# Patient Record
Sex: FEMALE | Race: WHITE | Employment: UNEMPLOYED | URBAN - METROPOLITAN AREA
[De-identification: names, ages, dates, MRNs, and addresses within clinical notes are randomized per-mention and may not be internally consistent; named-entity substitution may affect disease eponyms.]

---

## 2017-07-20 ENCOUNTER — HOSPITAL ENCOUNTER (OUTPATIENT)
Dept: ULTRASOUND IMAGING | Age: 32
Discharge: HOME | End: 2017-07-20
Attending: FAMILY MEDICINE
Payer: COMMERCIAL

## 2017-07-20 DIAGNOSIS — K43.9 HERNIA, VENTRAL: ICD-10-CM

## 2017-07-20 PROCEDURE — 76700 US EXAM ABDOM COMPLETE: CPT

## 2017-11-29 ENCOUNTER — HOSPITAL ENCOUNTER (OUTPATIENT)
Dept: GENERAL RADIOLOGY | Age: 32
Discharge: HOME | End: 2017-11-29
Attending: OBSTETRICS & GYNECOLOGY
Payer: COMMERCIAL

## 2017-11-29 DIAGNOSIS — Z31.41 FERTILITY TESTING: ICD-10-CM

## 2017-11-29 PROCEDURE — 74011636320 HC RX REV CODE- 636/320: Performed by: RADIOLOGY

## 2017-11-29 PROCEDURE — 58340 CATHETER FOR HYSTEROGRAPHY: CPT

## 2017-11-29 RX ADMIN — IOHEXOL 6 ML: 240 INJECTION, SOLUTION INTRATHECAL; INTRAVASCULAR; INTRAVENOUS; ORAL at 14:00

## 2017-11-29 NOTE — PROCEDURES
Patient presented to [facility] radiology for scheduled procedure to evaluate tubal patency. Procedure reviewed and verbal consent obtained   LMP 11/21/17  Cycle day8 today. Negative UPT obtained in [facility] today prior to procedure     Procedure:  Patient was placed in the lithotomy position and sterile speculum placed to allow visualization of the cervix. The cervix was swabbed with betadine. Saline infusion catheter was advanced through the cervical os and balloon inflated with 5cc air. All other instruments were removed from the vagina. Patient was extended to the supine position    film obtained to confirm proper positioning   With Radiologic assessment (provided by Dr Kath Walton) approximately 6cc of omnipaque was injected into the uterus. Normal uterine cavity was revealed. Identification of both fallopian tubes revealed bilateral spill consistent with tubal patency. Formal report to follow by radiology   The catheter was removed.  Patient tolerated the procedure well and findings were discussed

## 2018-05-11 ENCOUNTER — HOSPITAL ENCOUNTER (EMERGENCY)
Age: 33
Discharge: HOME | End: 2018-05-11
Attending: EMERGENCY MEDICINE
Payer: COMMERCIAL

## 2018-05-11 ENCOUNTER — APPOINTMENT (OUTPATIENT)
Dept: ULTRASOUND IMAGING | Age: 33
End: 2018-05-11
Attending: EMERGENCY MEDICINE
Payer: COMMERCIAL

## 2018-05-11 VITALS
SYSTOLIC BLOOD PRESSURE: 118 MMHG | OXYGEN SATURATION: 98 % | RESPIRATION RATE: 15 BRPM | HEART RATE: 84 BPM | WEIGHT: 110.45 LBS | HEIGHT: 66 IN | TEMPERATURE: 98.2 F | BODY MASS INDEX: 17.75 KG/M2 | DIASTOLIC BLOOD PRESSURE: 62 MMHG

## 2018-05-11 DIAGNOSIS — O03.4 INEVITABLE ABORTION: Primary | ICD-10-CM

## 2018-05-11 LAB
ANION GAP SERPL CALC-SCNC: 8 MMOL/L (ref 5–15)
APPEARANCE UR: CLEAR
BACTERIA URNS QL MICRO: NEGATIVE /HPF
BASOPHILS # BLD: 0 K/UL (ref 0–0.1)
BASOPHILS NFR BLD: 1 % (ref 0–1)
BILIRUB UR QL: NEGATIVE
BUN SERPL-MCNC: 11 MG/DL (ref 6–20)
BUN/CREAT SERPL: 16 (ref 12–20)
CALCIUM SERPL-MCNC: 9 MG/DL (ref 8.5–10.1)
CHLORIDE SERPL-SCNC: 109 MMOL/L (ref 97–108)
CO2 SERPL-SCNC: 25 MMOL/L (ref 21–32)
COLOR UR: ABNORMAL
CREAT SERPL-MCNC: 0.7 MG/DL (ref 0.55–1.02)
DIFFERENTIAL METHOD BLD: NORMAL
EOSINOPHIL # BLD: 0.1 K/UL (ref 0–0.4)
EOSINOPHIL NFR BLD: 1 % (ref 0–7)
EPITH CASTS URNS QL MICRO: ABNORMAL /LPF
ERYTHROCYTE [DISTWIDTH] IN BLOOD BY AUTOMATED COUNT: 11.9 % (ref 11.5–14.5)
GLUCOSE SERPL-MCNC: 97 MG/DL (ref 65–100)
GLUCOSE UR STRIP.AUTO-MCNC: NEGATIVE MG/DL
HCG SERPL-ACNC: 687 MIU/ML (ref 0–6)
HCG UR QL: POSITIVE
HCT VFR BLD AUTO: 38.2 % (ref 35–47)
HGB BLD-MCNC: 13 G/DL (ref 11.5–16)
HGB UR QL STRIP: ABNORMAL
IMM GRANULOCYTES # BLD: 0 K/UL (ref 0–0.04)
IMM GRANULOCYTES NFR BLD AUTO: 0 % (ref 0–0.5)
KETONES UR QL STRIP.AUTO: NEGATIVE MG/DL
LEUKOCYTE ESTERASE UR QL STRIP.AUTO: NEGATIVE
LYMPHOCYTES # BLD: 2.5 K/UL (ref 0.8–3.5)
LYMPHOCYTES NFR BLD: 28 % (ref 12–49)
MCH RBC QN AUTO: 30.7 PG (ref 26–34)
MCHC RBC AUTO-ENTMCNC: 34 G/DL (ref 30–36.5)
MCV RBC AUTO: 90.1 FL (ref 80–99)
MONOCYTES # BLD: 0.6 K/UL (ref 0–1)
MONOCYTES NFR BLD: 7 % (ref 5–13)
MUCOUS THREADS URNS QL MICRO: ABNORMAL /LPF
NEUTS SEG # BLD: 5.5 K/UL (ref 1.8–8)
NEUTS SEG NFR BLD: 63 % (ref 32–75)
NITRITE UR QL STRIP.AUTO: NEGATIVE
NRBC # BLD: 0 K/UL (ref 0–0.01)
NRBC BLD-RTO: 0 PER 100 WBC
PH UR STRIP: 6 [PH] (ref 5–8)
PLATELET # BLD AUTO: 175 K/UL (ref 150–400)
PMV BLD AUTO: 9.5 FL (ref 8.9–12.9)
POTASSIUM SERPL-SCNC: 3.4 MMOL/L (ref 3.5–5.1)
PROT UR STRIP-MCNC: NEGATIVE MG/DL
RBC # BLD AUTO: 4.24 M/UL (ref 3.8–5.2)
RBC #/AREA URNS HPF: ABNORMAL /HPF (ref 0–5)
SODIUM SERPL-SCNC: 142 MMOL/L (ref 136–145)
SP GR UR REFRACTOMETRY: 1.02 (ref 1–1.03)
UA: UC IF INDICATED,UAUC: ABNORMAL
UROBILINOGEN UR QL STRIP.AUTO: 0.2 EU/DL (ref 0.2–1)
WBC # BLD AUTO: 8.7 K/UL (ref 3.6–11)
WBC URNS QL MICRO: ABNORMAL /HPF (ref 0–4)

## 2018-05-11 PROCEDURE — 85025 COMPLETE CBC W/AUTO DIFF WBC: CPT | Performed by: EMERGENCY MEDICINE

## 2018-05-11 PROCEDURE — 76830 TRANSVAGINAL US NON-OB: CPT

## 2018-05-11 PROCEDURE — 80048 BASIC METABOLIC PNL TOTAL CA: CPT | Performed by: EMERGENCY MEDICINE

## 2018-05-11 PROCEDURE — 36415 COLL VENOUS BLD VENIPUNCTURE: CPT | Performed by: EMERGENCY MEDICINE

## 2018-05-11 PROCEDURE — 81001 URINALYSIS AUTO W/SCOPE: CPT | Performed by: EMERGENCY MEDICINE

## 2018-05-11 PROCEDURE — 99284 EMERGENCY DEPT VISIT MOD MDM: CPT

## 2018-05-11 PROCEDURE — 74011250637 HC RX REV CODE- 250/637: Performed by: EMERGENCY MEDICINE

## 2018-05-11 PROCEDURE — 84702 CHORIONIC GONADOTROPIN TEST: CPT | Performed by: EMERGENCY MEDICINE

## 2018-05-11 PROCEDURE — 81025 URINE PREGNANCY TEST: CPT

## 2018-05-11 RX ORDER — LORAZEPAM 1 MG/1
1 TABLET ORAL
Status: COMPLETED | OUTPATIENT
Start: 2018-05-11 | End: 2018-05-11

## 2018-05-11 RX ADMIN — LORAZEPAM 1 MG: 1 TABLET ORAL at 23:43

## 2018-05-11 NOTE — LETTER
[address] 
[facility] EMERGENCY DEPT 
[address] [place] [address] [address] [id number] [phone] Work/School Note Date: 5/11/2018 To Whom It May concern: 
 
Honorio Walsh was seen and treated today in the emergency room by the following provider(s): 
Attending Provider: Devorah Burton DO. Honorio Walsh may return to work on 5/15/18 Sincerely, Devorah Burton DO

## 2018-05-12 NOTE — DISCHARGE INSTRUCTIONS
Miscarriage: Care Instructions  Your Care Instructions    The loss of a pregnancy can be very hard. You may wonder why it happened or blame yourself. Miscarriages are common and are not caused by exercise, stress, or sex. Most happen because the fertilized egg in the uterus does not develop normally. There is no treatment that can stop a miscarriage. As long as you do not have heavy blood loss, fever, weakness, or other signs of infection, you can let a miscarriage follow its own course. This can take several days. Your body will recover over the next several weeks. Having a miscarriage does not mean you cannot have a normal pregnancy in the future. The doctor has checked you carefully, but problems can develop later. If you notice any problems or new symptoms, get medical treatment right away. Follow-up care is a key part of your treatment and safety. Be sure to make and go to all appointments, and call your doctor if you are having problems. It's also a good idea to know your test results and keep a list of the medicines you take. How can you care for yourself at home? · You will probably have some vaginal bleeding for 1 to 2 weeks. It may be similar to or slightly heavier than a normal period. The bleeding should get lighter after a week. Use pads instead of tampons. You may use tampons during your next period, which should start in 3 to 6 weeks. · Take an over-the-counter pain medicine, such as acetaminophen (Tylenol), ibuprofen (Advil, Motrin), or naproxen (Aleve) for cramps. Read and follow all instructions on the label. You may have cramps for several days after the miscarriage. · Do not take two or more pain medicines at the same time unless the doctor told you to. Many pain medicines have acetaminophen, which is Tylenol. Too much acetaminophen (Tylenol) can be harmful. · Use a clear container to save any tissue that you pass. Take it to your doctor's office as soon as you can.   · Do not have sex until the bleeding stops. · You may return to your normal activities if you feel well enough to do so. But you should avoid heavy exercise until the bleeding stops. · If you plan to get pregnant again, check with your doctor. Most doctors suggest waiting until you have had at least one normal period before you try to get pregnant. · If you do not want to get pregnant, ask your doctor about birth control. You can get pregnant again before your next period starts if you are not using birth control. · You may be low in iron because of blood loss. Eat a balanced diet that is high in iron and vitamin C. Foods rich in iron include red meat, shellfish, eggs, beans, and leafy green vegetables. Foods high in vitamin C include citrus fruits, tomatoes, and broccoli. Talk to your doctor about whether you need to take iron pills or a multivitamin. · The loss of a pregnancy can be very hard. You may wonder why it happened and blame yourself. Talking to family members, friends, a counselor, or your doctor may help you cope with your loss. When should you call for help? Call 911 anytime you think you may need emergency care. For example, call if:  ? · You passed out (lost consciousness). ?Call your doctor now or seek immediate medical care if:  ? · You have severe vaginal bleeding. ? · You are dizzy or lightheaded, or you feel like you may faint. ? · You have new or worse pain in your belly or pelvis. ? · You have a fever. ? · You have vaginal discharge that smells bad. ? Watch closely for changes in your health, and be sure to contact your doctor if:  ? · You do not get better as expected. Where can you learn more? Go to http://mook-jo.info/. Enter E802 in the search box to learn more about \"Miscarriage: Care Instructions. \"  Current as of: March 16, 2017  Content Version: 11.4  © 0699-7547 [employer], [employer].  Care instructions adapted under license by [employer] (which disclaims liability or warranty for this information). If you have questions about a medical condition or this instruction, always ask your healthcare professional. [address] any warranty or liability for your use of this information.

## 2018-05-12 NOTE — ED NOTES
Dr. Mendy Church going over dc instructions with pt and family at this time. No questions or concerns. Pt taken out to car with family via wheelchair.

## 2018-05-12 NOTE — ED PROVIDER NOTES
EMERGENCY DEPARTMENT HISTORY AND PHYSICAL EXAM      Date: 5/11/2018  Patient Name: Rebel Melgar    History of Presenting Illness     Chief Complaint   Patient presents with    Miscarriage     She is 6 weeks pregnant and believes she is having a miscarriage - heavy bleeding. History Provided By: Patient    HPI: Rebel Melgar, 34 y.o. female, presents ambulatory to the ED with cc of heavy vaginal bleeding with associated lower back pain, pelvic and hip pain that occurred at 4 PM today. Per pt, she had noticed \"light spotting\" 2 days ago and reports being 6 weeks pregnant. She notes that she had gone to see her OB who did an US and found that they could not confirm the embryo was in the uterus. Pt has a current GPA of 2:1:0 and reports no prior hx of bleeding problems. She has taken Tylenol at 4:30 PM. Pt denies any fever. There are no other complaints, changes, or physical findings at this time. PCP: None      Past History     Past Medical History:  No past medical history on file. Past Surgical History:  No past surgical history on file. Family History:  No family history on file. Social History:  Social History   Substance Use Topics    Smoking status: Not on file    Smokeless tobacco: Not on file    Alcohol use Not on file       Allergies:  No Known Allergies      Review of Systems   Review of Systems   Constitutional: Negative for chills and fever. HENT: Negative for congestion and sore throat. Eyes: Negative for visual disturbance. Respiratory: Negative for cough and shortness of breath. Cardiovascular: Negative for chest pain and leg swelling. Gastrointestinal: Negative for abdominal pain, blood in stool, diarrhea and nausea. Endocrine: Negative for polyuria. Genitourinary: Positive for pelvic pain and vaginal bleeding. Negative for dysuria, flank pain and vaginal discharge. Musculoskeletal: Positive for back pain (lower). Negative for myalgias.    Skin: Negative for rash.   Allergic/Immunologic: Negative for immunocompromised state. Neurological: Negative for weakness and headaches. Psychiatric/Behavioral: Negative for confusion. Physical Exam   Physical Exam   Constitutional: She is oriented to person, place, and time. She appears well-developed and well-nourished. Pt emotionally upset, crying   HENT:   Head: Normocephalic and atraumatic. Moist mucous membranes   Eyes: Conjunctivae are normal. Pupils are equal, round, and reactive to light. Right eye exhibits no discharge. Left eye exhibits no discharge. Neck: Normal range of motion. Neck supple. No tracheal deviation present. Cardiovascular: Normal rate, regular rhythm and normal heart sounds. No murmur heard. Pulmonary/Chest: Effort normal and breath sounds normal. No respiratory distress. She has no wheezes. She has no rales. Abdominal: Soft. Bowel sounds are normal. There is no tenderness. There is no rebound and no guarding. Musculoskeletal: Normal range of motion. She exhibits no edema, tenderness or deformity. Neurological: She is alert and oriented to person, place, and time. Skin: Skin is warm and dry. No rash noted. No erythema. Psychiatric: Her behavior is normal.   Nursing note and vitals reviewed.         Diagnostic Study Results     Labs -     Recent Results (from the past 12 hour(s))   URINALYSIS W/ REFLEX CULTURE    Collection Time: 05/11/18  9:21 PM   Result Value Ref Range    Color YELLOW/STRAW      Appearance CLEAR CLEAR      Specific gravity 1.021 1.003 - 1.030      pH (UA) 6.0 5.0 - 8.0      Protein NEGATIVE  NEG mg/dL    Glucose NEGATIVE  NEG mg/dL    Ketone NEGATIVE  NEG mg/dL    Bilirubin NEGATIVE  NEG      Blood LARGE (A) NEG      Urobilinogen 0.2 0.2 - 1.0 EU/dL    Nitrites NEGATIVE  NEG      Leukocyte Esterase NEGATIVE  NEG      WBC 0-4 0 - 4 /hpf    RBC 5-10 0 - 5 /hpf    Epithelial cells FEW FEW /lpf    Bacteria NEGATIVE  NEG /hpf    UA:UC IF INDICATED CULTURE NOT INDICATED BY UA RESULT CNI      Mucus TRACE (A) NEG /lpf   HCG URINE, QL. - POC    Collection Time: 05/11/18  9:21 PM   Result Value Ref Range    Pregnancy test,urine (POC) POSITIVE (A) NEG     CBC WITH AUTOMATED DIFF    Collection Time: 05/11/18 10:17 PM   Result Value Ref Range    WBC 8.7 3.6 - 11.0 K/uL    RBC 4.24 3.80 - 5.20 M/uL    HGB 13.0 11.5 - 16.0 g/dL    HCT 38.2 35.0 - 47.0 %    MCV 90.1 80.0 - 99.0 FL    MCH 30.7 26.0 - 34.0 PG    MCHC 34.0 30.0 - 36.5 g/dL    RDW 11.9 11.5 - 14.5 %    PLATELET 226 222 - 111 K/uL    MPV 9.5 8.9 - 12.9 FL    NRBC 0.0 0  WBC    ABSOLUTE NRBC 0.00 0.00 - 0.01 K/uL    NEUTROPHILS 63 32 - 75 %    LYMPHOCYTES 28 12 - 49 %    MONOCYTES 7 5 - 13 %    EOSINOPHILS 1 0 - 7 %    BASOPHILS 1 0 - 1 %    IMMATURE GRANULOCYTES 0 0.0 - 0.5 %    ABS. NEUTROPHILS 5.5 1.8 - 8.0 K/UL    ABS. LYMPHOCYTES 2.5 0.8 - 3.5 K/UL    ABS. MONOCYTES 0.6 0.0 - 1.0 K/UL    ABS. EOSINOPHILS 0.1 0.0 - 0.4 K/UL    ABS. BASOPHILS 0.0 0.0 - 0.1 K/UL    ABS. IMM. GRANS. 0.0 0.00 - 0.04 K/UL    DF AUTOMATED     METABOLIC PANEL, BASIC    Collection Time: 05/11/18 10:17 PM   Result Value Ref Range    Sodium 142 136 - 145 mmol/L    Potassium 3.4 (L) 3.5 - 5.1 mmol/L    Chloride 109 (H) 97 - 108 mmol/L    CO2 25 21 - 32 mmol/L    Anion gap 8 5 - 15 mmol/L    Glucose 97 65 - 100 mg/dL    BUN 11 6 - 20 MG/DL    Creatinine 0.70 0.55 - 1.02 MG/DL    BUN/Creatinine ratio 16 12 - 20      GFR est AA >60 >60 ml/min/1.73m2    GFR est non-AA >60 >60 ml/min/1.73m2    Calcium 9.0 8.5 - 10.1 MG/DL   TOTAL HCG, QT. Collection Time: 05/11/18 10:17 PM   Result Value Ref Range    Beta HCG,  (H) 0 - 6 MIU/ML       Radiologic Studies -   US TRANSVAGINAL   Final Result   FINDINGS transvaginal: The UTERUS MEASURES 8.0 x 4.8 x 5.6 cm. The central  endometrium measures 20mm in thickness. Within the fundal endometrial canal,  there is a small well-circumscribed fluid sac with a possible decidual reaction.   The sac measures 0.4 x 0.3 x 0.5 cm, too small for measurement of gestational  age. There is no fetal pole, yolk sac or other structure within the fluid sac. The internal cervical os is closed. Nabothian cysts are suggested at the  internal cervical os.     Amniotic fluid volume is small. . . The placenta cannot be evaluated due to  early gestational age. .     There is no free fluid in the cul-de-sac.     The RIGHT OVARY measures 2.5 x 1.8 x 3.1 cm . The right ovary contains a  dominant cyst measuring 1.1 cm. The right ovary shows normal blood flow by color  Doppler. LEFT OVARY cannot be seen because of overlying bowel gas obscuring the  adnexal regions.      IMPRESSION  IMPRESSION:   1. Transabdominal pelvic ultrasound not performed. 2. Transvaginal pelvic ultrasound revealing single intrauterine gestational sac  is favored, too small for estimate of gestational age and containing no fetal  pole or yolk sac. Follow-up ultrasonography will be necessary, and ectopic  gestation is not excluded. . Correlate with serum beta hCG. Dominant right  ovarian cyst. Left ovary not seen. .        Medical Decision Making   I am the first provider for this patient. I reviewed the vital signs, available nursing notes, past medical history, past surgical history, family history and social history. Vital Signs-Reviewed the patient's vital signs.   Patient Vitals for the past 12 hrs:   Temp Pulse Resp BP SpO2   05/11/18 2310 98.4 °F (36.9 °C) 80 15 113/51 100 %   05/11/18 2216 - - - 111/58 -   05/11/18 2154 - - - - 100 %   05/11/18 2116 98.5 °F (36.9 °C) (!) 106 16 (!) 141/91 99 %       Pulse Oximetry Analysis - 100% on room air    Cardiac Monitor:   Rate: 80 bpm  Rhythm: Normal Sinus Rhythm 113/51     Records Reviewed: Nursing Notes, Old Medical Records and Previous Laboratory Studies    Provider Notes (Medical Decision Making):   DDx: AB, threatened AB, ectopic pregnancy, most consistent with spontaneous AB    ED Course:   Initial assessment performed. The patients presenting problems have been discussed, and they are in agreement with the care plan formulated and outlined with them. I have encouraged them to ask questions as they arise throughout their visit. Disposition:  DISCHARGE NOTE  11:46 PM  The patient has been re-evaluated and is ready for discharge. Reviewed available results with patient. Counseled patient on diagnosis and care plan. Patient has expressed understanding, and all questions have been answered. Patient agrees with plan and agrees to follow up as recommended, or return to the ED if their symptoms worsen. Discharge instructions have been provided and explained to the patient, along with reasons to return to the ED. PLAN:  1. There are no discharge medications for this patient. 2.   Follow-up Information     Follow up With Details Comments Karen Lorenz MD Schedule an appointment as soon as possible for a visit  [address] [facility]  [phone]      [facility] EMERGENCY DEPT  If symptoms worsen [address] [place] [address]  [phone]        Return to ED if worse     Diagnosis     Clinical Impression:   1. Inevitable         Attestations: This note is prepared by Angélica Herron, acting as Scribe for [employer], DO. [employer], DO: The scribe's documentation has been prepared under my direction and personally reviewed by me in its entirety. I confirm that the note above accurately reflects all work, treatment, procedures, and medical decision making performed by me.

## 2018-08-08 LAB
CHLAMYDIA, EXTERNAL: NEGATIVE
HBSAG, EXTERNAL: NEGATIVE
HIV, EXTERNAL: NON REACTIVE
N. GONORRHEA, EXTERNAL: NEGATIVE
RUBELLA, EXTERNAL: NORMAL
T. PALLIDUM, EXTERNAL: NEGATIVE

## 2019-02-15 LAB — GRBS, EXTERNAL: NEGATIVE

## 2019-03-07 ENCOUNTER — ANESTHESIA EVENT (OUTPATIENT)
Dept: LABOR AND DELIVERY | Age: 34
End: 2019-03-07
Payer: COMMERCIAL

## 2019-03-07 ENCOUNTER — HOSPITAL ENCOUNTER (INPATIENT)
Age: 34
LOS: 2 days | Discharge: HOME | End: 2019-03-09
Attending: OBSTETRICS & GYNECOLOGY | Admitting: OBSTETRICS & GYNECOLOGY
Payer: COMMERCIAL

## 2019-03-07 ENCOUNTER — ANESTHESIA (OUTPATIENT)
Dept: LABOR AND DELIVERY | Age: 34
End: 2019-03-07
Payer: COMMERCIAL

## 2019-03-07 DIAGNOSIS — G89.18 POST-OP PAIN: Primary | ICD-10-CM

## 2019-03-07 PROBLEM — Z34.90 PREGNANCY: Status: ACTIVE | Noted: 2019-03-07

## 2019-03-07 LAB
ABO + RH BLD: NORMAL
BASOPHILS # BLD: 0 K/UL (ref 0–0.1)
BASOPHILS NFR BLD: 0 % (ref 0–1)
BLD PROD TYP BPU: NORMAL
BLD PROD TYP BPU: NORMAL
BLOOD GROUP ANTIBODIES SERPL: NORMAL
BLOOD GROUP ANTIBODIES SERPL: NORMAL
BPU ID: NORMAL
BPU ID: NORMAL
CROSSMATCH RESULT,%XM: NORMAL
CROSSMATCH RESULT,%XM: NORMAL
DIFFERENTIAL METHOD BLD: ABNORMAL
EOSINOPHIL # BLD: 0 K/UL (ref 0–0.4)
EOSINOPHIL NFR BLD: 0 % (ref 0–7)
ERYTHROCYTE [DISTWIDTH] IN BLOOD BY AUTOMATED COUNT: 14 % (ref 11.5–14.5)
HCT VFR BLD AUTO: 37.1 % (ref 35–47)
HGB BLD-MCNC: 12.7 G/DL (ref 11.5–16)
IMM GRANULOCYTES # BLD AUTO: 0.2 K/UL (ref 0–0.04)
IMM GRANULOCYTES NFR BLD AUTO: 2 % (ref 0–0.5)
LYMPHOCYTES # BLD: 2.3 K/UL (ref 0.8–3.5)
LYMPHOCYTES NFR BLD: 20 % (ref 12–49)
MCH RBC QN AUTO: 32 PG (ref 26–34)
MCHC RBC AUTO-ENTMCNC: 34.2 G/DL (ref 30–36.5)
MCV RBC AUTO: 93.5 FL (ref 80–99)
MONOCYTES # BLD: 0.7 K/UL (ref 0–1)
MONOCYTES NFR BLD: 6 % (ref 5–13)
NEUTS SEG # BLD: 8.1 K/UL (ref 1.8–8)
NEUTS SEG NFR BLD: 72 % (ref 32–75)
NRBC # BLD: 0 K/UL (ref 0–0.01)
NRBC BLD-RTO: 0 PER 100 WBC
PLATELET # BLD AUTO: 159 K/UL (ref 150–400)
PMV BLD AUTO: 10 FL (ref 8.9–12.9)
RBC # BLD AUTO: 3.97 M/UL (ref 3.8–5.2)
SPECIMEN EXP DATE BLD: NORMAL
STATUS OF UNIT,%ST: NORMAL
STATUS OF UNIT,%ST: NORMAL
UNIT DIVISION, %UDIV: 0
UNIT DIVISION, %UDIV: 0
WBC # BLD AUTO: 11.3 K/UL (ref 3.6–11)

## 2019-03-07 PROCEDURE — 76060000033 HC ANESTHESIA 1 TO 1.5 HR: Performed by: OBSTETRICS & GYNECOLOGY

## 2019-03-07 PROCEDURE — 86921 COMPATIBILITY TEST INCUBATE: CPT

## 2019-03-07 PROCEDURE — 4A0HXCZ MEASUREMENT OF PRODUCTS OF CONCEPTION, CARDIAC RATE, EXTERNAL APPROACH: ICD-10-PCS | Performed by: OBSTETRICS & GYNECOLOGY

## 2019-03-07 PROCEDURE — 85025 COMPLETE CBC W/AUTO DIFF WBC: CPT

## 2019-03-07 PROCEDURE — 74011250636 HC RX REV CODE- 250/636

## 2019-03-07 PROCEDURE — 77010026065 HC OXYGEN MINIMUM MEDICAL AIR

## 2019-03-07 PROCEDURE — 86920 COMPATIBILITY TEST SPIN: CPT

## 2019-03-07 PROCEDURE — 75410000002 HC LABOR FEE PER 1 HR

## 2019-03-07 PROCEDURE — 76060000078 HC EPIDURAL ANESTHESIA: Performed by: OBSTETRICS & GYNECOLOGY

## 2019-03-07 PROCEDURE — 75410000003 HC RECOV DEL/VAG/CSECN EA 0.5 HR

## 2019-03-07 PROCEDURE — 86644 CMV ANTIBODY: CPT

## 2019-03-07 PROCEDURE — 76010000391 HC C SECN FIRST 1 HR: Performed by: OBSTETRICS & GYNECOLOGY

## 2019-03-07 PROCEDURE — 86870 RBC ANTIBODY IDENTIFICATION: CPT

## 2019-03-07 PROCEDURE — 74011000250 HC RX REV CODE- 250

## 2019-03-07 PROCEDURE — 74011250636 HC RX REV CODE- 250/636: Performed by: OBSTETRICS & GYNECOLOGY

## 2019-03-07 PROCEDURE — 76010000392 HC C SECN EA ADDL 0.5 HR: Performed by: OBSTETRICS & GYNECOLOGY

## 2019-03-07 PROCEDURE — 77030007866 HC KT SPN ANES BBMI -B: Performed by: ANESTHESIOLOGY

## 2019-03-07 PROCEDURE — 65410000002 HC RM PRIVATE OB

## 2019-03-07 PROCEDURE — 86900 BLOOD TYPING SEROLOGIC ABO: CPT

## 2019-03-07 PROCEDURE — 86922 COMPATIBILITY TEST ANTIGLOB: CPT

## 2019-03-07 PROCEDURE — 36415 COLL VENOUS BLD VENIPUNCTURE: CPT

## 2019-03-07 RX ORDER — MORPHINE SULFATE 0.5 MG/ML
INJECTION, SOLUTION EPIDURAL; INTRATHECAL; INTRAVENOUS AS NEEDED
Status: DISCONTINUED | OUTPATIENT
Start: 2019-03-07 | End: 2019-03-07 | Stop reason: HOSPADM

## 2019-03-07 RX ORDER — SODIUM CHLORIDE 0.9 % (FLUSH) 0.9 %
5-40 SYRINGE (ML) INJECTION EVERY 8 HOURS
Status: DISCONTINUED | OUTPATIENT
Start: 2019-03-07 | End: 2019-03-09

## 2019-03-07 RX ORDER — DOCUSATE SODIUM 100 MG/1
100 CAPSULE, LIQUID FILLED ORAL 2 TIMES DAILY
Status: DISCONTINUED | OUTPATIENT
Start: 2019-03-07 | End: 2019-03-09 | Stop reason: HOSPADM

## 2019-03-07 RX ORDER — SODIUM CHLORIDE 0.9 % (FLUSH) 0.9 %
5-40 SYRINGE (ML) INJECTION AS NEEDED
Status: DISCONTINUED | OUTPATIENT
Start: 2019-03-07 | End: 2019-03-09

## 2019-03-07 RX ORDER — SODIUM CHLORIDE 0.9 % (FLUSH) 0.9 %
5-40 SYRINGE (ML) INJECTION AS NEEDED
Status: DISCONTINUED | OUTPATIENT
Start: 2019-03-07 | End: 2019-03-07 | Stop reason: HOSPADM

## 2019-03-07 RX ORDER — ONDANSETRON 2 MG/ML
4 INJECTION INTRAMUSCULAR; INTRAVENOUS
Status: DISCONTINUED | OUTPATIENT
Start: 2019-03-07 | End: 2019-03-09 | Stop reason: HOSPADM

## 2019-03-07 RX ORDER — DIPHENHYDRAMINE HYDROCHLORIDE 50 MG/ML
25-50 INJECTION, SOLUTION INTRAMUSCULAR; INTRAVENOUS
Status: DISCONTINUED | OUTPATIENT
Start: 2019-03-07 | End: 2019-03-09 | Stop reason: HOSPADM

## 2019-03-07 RX ORDER — NALOXONE HYDROCHLORIDE 0.4 MG/ML
0.4 INJECTION, SOLUTION INTRAMUSCULAR; INTRAVENOUS; SUBCUTANEOUS AS NEEDED
Status: DISCONTINUED | OUTPATIENT
Start: 2019-03-07 | End: 2019-03-07

## 2019-03-07 RX ORDER — PHENYLEPHRINE HCL IN 0.9% NACL 0.4MG/10ML
SYRINGE (ML) INTRAVENOUS AS NEEDED
Status: DISCONTINUED | OUTPATIENT
Start: 2019-03-07 | End: 2019-03-07 | Stop reason: HOSPADM

## 2019-03-07 RX ORDER — BUPIVACAINE HYDROCHLORIDE 7.5 MG/ML
INJECTION, SOLUTION EPIDURAL; RETROBULBAR AS NEEDED
Status: DISCONTINUED | OUTPATIENT
Start: 2019-03-07 | End: 2019-03-07 | Stop reason: HOSPADM

## 2019-03-07 RX ORDER — MORPHINE SULFATE 10 MG/ML
4 INJECTION, SOLUTION INTRAMUSCULAR; INTRAVENOUS
Status: DISCONTINUED | OUTPATIENT
Start: 2019-03-07 | End: 2019-03-09 | Stop reason: HOSPADM

## 2019-03-07 RX ORDER — SIMETHICONE 80 MG
80 TABLET,CHEWABLE ORAL AS NEEDED
Status: DISCONTINUED | OUTPATIENT
Start: 2019-03-07 | End: 2019-03-09 | Stop reason: HOSPADM

## 2019-03-07 RX ORDER — CEFAZOLIN SODIUM/WATER 2 G/20 ML
2 SYRINGE (ML) INTRAVENOUS ONCE
Status: COMPLETED | OUTPATIENT
Start: 2019-03-07 | End: 2019-03-07

## 2019-03-07 RX ORDER — SODIUM CHLORIDE, SODIUM LACTATE, POTASSIUM CHLORIDE, CALCIUM CHLORIDE 600; 310; 30; 20 MG/100ML; MG/100ML; MG/100ML; MG/100ML
125 INJECTION, SOLUTION INTRAVENOUS CONTINUOUS
Status: DISCONTINUED | OUTPATIENT
Start: 2019-03-07 | End: 2019-03-09 | Stop reason: HOSPADM

## 2019-03-07 RX ORDER — SODIUM CHLORIDE, SODIUM LACTATE, POTASSIUM CHLORIDE, CALCIUM CHLORIDE 600; 310; 30; 20 MG/100ML; MG/100ML; MG/100ML; MG/100ML
INJECTION, SOLUTION INTRAVENOUS
Status: DISCONTINUED | OUTPATIENT
Start: 2019-03-07 | End: 2019-03-07 | Stop reason: HOSPADM

## 2019-03-07 RX ORDER — DIPHENHYDRAMINE HCL 25 MG
25 CAPSULE ORAL
Status: DISCONTINUED | OUTPATIENT
Start: 2019-03-07 | End: 2019-03-09 | Stop reason: HOSPADM

## 2019-03-07 RX ORDER — ONDANSETRON 2 MG/ML
INJECTION INTRAMUSCULAR; INTRAVENOUS AS NEEDED
Status: DISCONTINUED | OUTPATIENT
Start: 2019-03-07 | End: 2019-03-07 | Stop reason: HOSPADM

## 2019-03-07 RX ORDER — SODIUM CHLORIDE 0.9 % (FLUSH) 0.9 %
5-40 SYRINGE (ML) INJECTION EVERY 8 HOURS
Status: DISCONTINUED | OUTPATIENT
Start: 2019-03-07 | End: 2019-03-07 | Stop reason: HOSPADM

## 2019-03-07 RX ORDER — NALOXONE HYDROCHLORIDE 0.4 MG/ML
0.4 INJECTION, SOLUTION INTRAMUSCULAR; INTRAVENOUS; SUBCUTANEOUS AS NEEDED
Status: DISCONTINUED | OUTPATIENT
Start: 2019-03-07 | End: 2019-03-09 | Stop reason: HOSPADM

## 2019-03-07 RX ORDER — ACETAMINOPHEN 10 MG/ML
INJECTION, SOLUTION INTRAVENOUS AS NEEDED
Status: DISCONTINUED | OUTPATIENT
Start: 2019-03-07 | End: 2019-03-07 | Stop reason: HOSPADM

## 2019-03-07 RX ORDER — ONDANSETRON 2 MG/ML
4 INJECTION INTRAMUSCULAR; INTRAVENOUS
Status: DISCONTINUED | OUTPATIENT
Start: 2019-03-07 | End: 2019-03-07

## 2019-03-07 RX ORDER — OXYTOCIN 10 [USP'U]/ML
INJECTION, SOLUTION INTRAMUSCULAR; INTRAVENOUS AS NEEDED
Status: DISCONTINUED | OUTPATIENT
Start: 2019-03-07 | End: 2019-03-07 | Stop reason: HOSPADM

## 2019-03-07 RX ORDER — KETOROLAC TROMETHAMINE 30 MG/ML
30 INJECTION, SOLUTION INTRAMUSCULAR; INTRAVENOUS
Status: DISPENSED | OUTPATIENT
Start: 2019-03-07 | End: 2019-03-08

## 2019-03-07 RX ORDER — OXYTOCIN/RINGER'S LACTATE 20/1000 ML
999 PLASTIC BAG, INJECTION (ML) INTRAVENOUS ONCE
Status: ACTIVE | OUTPATIENT
Start: 2019-03-07 | End: 2019-03-08

## 2019-03-07 RX ORDER — KETOROLAC TROMETHAMINE 30 MG/ML
15 INJECTION, SOLUTION INTRAMUSCULAR; INTRAVENOUS
Status: ACTIVE | OUTPATIENT
Start: 2019-03-07 | End: 2019-03-08

## 2019-03-07 RX ORDER — IBUPROFEN 600 MG/1
600 TABLET ORAL EVERY 8 HOURS
Status: DISCONTINUED | OUTPATIENT
Start: 2019-03-08 | End: 2019-03-09 | Stop reason: HOSPADM

## 2019-03-07 RX ORDER — IBUPROFEN 600 MG/1
600 TABLET ORAL
Status: DISCONTINUED | OUTPATIENT
Start: 2019-03-07 | End: 2019-03-09 | Stop reason: HOSPADM

## 2019-03-07 RX ORDER — SODIUM CHLORIDE, SODIUM LACTATE, POTASSIUM CHLORIDE, CALCIUM CHLORIDE 600; 310; 30; 20 MG/100ML; MG/100ML; MG/100ML; MG/100ML
1000 INJECTION, SOLUTION INTRAVENOUS CONTINUOUS
Status: DISCONTINUED | OUTPATIENT
Start: 2019-03-07 | End: 2019-03-07 | Stop reason: HOSPADM

## 2019-03-07 RX ORDER — OXYCODONE AND ACETAMINOPHEN 5; 325 MG/1; MG/1
1 TABLET ORAL
Status: DISCONTINUED | OUTPATIENT
Start: 2019-03-07 | End: 2019-03-07

## 2019-03-07 RX ORDER — OXYCODONE AND ACETAMINOPHEN 5; 325 MG/1; MG/1
1-2 TABLET ORAL
Status: DISCONTINUED | OUTPATIENT
Start: 2019-03-07 | End: 2019-03-09 | Stop reason: HOSPADM

## 2019-03-07 RX ADMIN — Medication 80 MCG: at 14:30

## 2019-03-07 RX ADMIN — SODIUM CHLORIDE, SODIUM LACTATE, POTASSIUM CHLORIDE, CALCIUM CHLORIDE: 600; 310; 30; 20 INJECTION, SOLUTION INTRAVENOUS at 15:08

## 2019-03-07 RX ADMIN — SODIUM CHLORIDE, SODIUM LACTATE, POTASSIUM CHLORIDE, CALCIUM CHLORIDE: 600; 310; 30; 20 INJECTION, SOLUTION INTRAVENOUS at 14:36

## 2019-03-07 RX ADMIN — BUPIVACAINE HYDROCHLORIDE 12.5 MG: 7.5 INJECTION, SOLUTION EPIDURAL; RETROBULBAR at 14:26

## 2019-03-07 RX ADMIN — MORPHINE SULFATE 500 MCG: 0.5 INJECTION, SOLUTION EPIDURAL; INTRATHECAL; INTRAVENOUS at 14:26

## 2019-03-07 RX ADMIN — SODIUM CHLORIDE, SODIUM LACTATE, POTASSIUM CHLORIDE, AND CALCIUM CHLORIDE 1000 ML: 600; 310; 30; 20 INJECTION, SOLUTION INTRAVENOUS at 12:15

## 2019-03-07 RX ADMIN — KETOROLAC TROMETHAMINE 30 MG: 30 INJECTION, SOLUTION INTRAMUSCULAR at 19:08

## 2019-03-07 RX ADMIN — ACETAMINOPHEN 1000 MG: 10 INJECTION, SOLUTION INTRAVENOUS at 14:45

## 2019-03-07 RX ADMIN — Medication 80 MCG: at 14:33

## 2019-03-07 RX ADMIN — Medication 40 MCG: at 14:47

## 2019-03-07 RX ADMIN — Medication 80 MCG: at 14:26

## 2019-03-07 RX ADMIN — ONDANSETRON 4 MG: 2 INJECTION INTRAMUSCULAR; INTRAVENOUS at 14:43

## 2019-03-07 RX ADMIN — OXYTOCIN 20 UNITS: 10 INJECTION, SOLUTION INTRAMUSCULAR; INTRAVENOUS at 14:43

## 2019-03-07 RX ADMIN — Medication 2 G: at 14:10

## 2019-03-07 RX ADMIN — Medication 40 MCG: at 14:52

## 2019-03-07 NOTE — H&P
History & Physical    Name: Irma Race MRN: [id number]  SSN: [id number]    YOB: 1985  Age: 28 y.o. Sex: female      Subjective:     Estimated Date of Delivery: 3/10/19  OB History    Para Term  AB Living   3 1 1   1 1   SAB TAB Ectopic Molar Multiple Live Births   1         1      # Outcome Date GA Lbr Marcus/2nd Weight Sex Delivery Anes PTL Lv   3 Current            2 SAB 2018           1 Term 13     CS-LTranv   BENNY          Ms. Cecilia Guthrie admitted with pregnancy at 39w4d for  section due to previous  section. Prenatal course was normal. Please see prenatal records for details. Patient is Rh negative and received rhogam at 28 weeks. Hx of anxiety, no meds. Past Medical History:   Diagnosis Date    Rhesus isoimmunization affecting pregnancy     A negative     Past Surgical History:   Procedure Laterality Date    HX  SECTION      HX OTHER SURGICAL      rectal distasis hernia    HX OTHER SURGICAL      disfunctional bladder        Social History     Occupational History    Not on file   Tobacco Use    Smoking status: Never Smoker    Smokeless tobacco: Never Used   Substance and Sexual Activity    Alcohol use: No     Frequency: Never    Drug use: No    Sexual activity: Yes     Partners: Male     Family History   Problem Relation Age of Onset    Diabetes Mother     Hypertension Father     Psychiatric Disorder Sister        No Known Allergies  Prior to Admission medications    Not on File        Review of Systems: A comprehensive review of systems was negative except for that written in the History of Present Illness. Objective:     Vitals:  Vitals:    19 1242 19 1247 19 1250 19 1252   BP:   117/56    Pulse:   81    Resp:   16    Temp:   98.1 °F (36.7 °C)    SpO2: 99% 99% 99% 99%   Weight:       Height:            Physical Exam:  Patient without distress.   Heart: Regular rate and rhythm  Lung: normal respiratory effort  Abdomen: soft, nontender  Fundus: soft and non tender  Perineum: blood absent, amniotic fluid absent  Lower Extremities: no edema  Membranes:  Intact  Fetal Heart Rate: Reactive    Prenatal Labs:   Lab Results   Component Value Date/Time    ABO/Rh(D) A NEGATIVE 2019 12:20 PM         Impression/Plan:     Plan:  Admit for  section. Group B Strep was negative. Discussed the risks of surgery including the risks of bleeding, infection, deep vein thrombosis, and surgical injuries to internal organs including but not limited to the bowels, bladder, rectum, and female reproductive organs. The patient understands the risks; any and all questions were answered to the patient's satisfaction.     Signed By:  Dmaaris Fischer MD     2019

## 2019-03-07 NOTE — OP NOTES
Operative Note    Name: Mony Simmons   Medical Record Number: [id number]   YOB: 1985  Today's Date: 2019      Pre-operative Diagnosis: Repeat C/S; /EDC 3/10/19    Post-operative Diagnosis: same    Operation: low transverse  section Procedure(s):   SECTION    Surgeon(s):  MD Erick De La Cruz MD    Anesthesia: Spinal    Prophylactic Antibiotics: Ancef  DVT Prophylaxis: Sequential Compression Devices         Fetal Description: sen     Birth Information:   Information for the patient's :  Kirsten Chavez [[id number]]   Delivery of a 3.785 kg female infant on 3/7/2019 at 2:42 PM. Apgars were 8  and 9 . Umbilical Cord: 3 Vessels     Umbilical Cord Events: None     Placenta: Manual Removal removal with Intact; Normal appearance. Amniotic Fluid Volume:  Large     Amniotic Fluid Description:  Clear          Placenta:  spontaneous    Estimated Blood Loss (ml):  800 mL    Specimens: none           Complications:  none    Procedure Detail:      After proper patient identification and consent, the patient was taken to the operating room, where spinal anesthesia was administered and found to be adequate. Martinez catheter was placed using sterile technique. The patient was prepped and draped in the normal sterile fashion. The abdomen was entered using the Pfannenstiel technique. The peritoneum was entered sharply well superior to the bladder without any apparent injury. The bladder flap was not created. A low transverse uterine incision was made with the scalpel  and extended with blunt finger dissection. Amniotomy was performed and the fluid was copius amount clear. The babys head was then delivered atraumatically. The cord was clamped and cut and the baby was handed off to Nursing staff in attendance. Placenta was then removed from the uterus. The uterus was curettaged with a moist lap pad and cleared of all clots and debris.  The uterine incision was closed with 0 monocryl, double layer  in running locking fashion. A figure of eight suture was placed over an area of bleeding with good hemostasis assured. The uterus was returned to the abdomen and hemostasis was confirmed. The fascia was closed with 0 Vicryl in a running fashion. The subcutaneous space was irrigated and made hemostatic with cautery. The subcutaneous layer was closed with 3-0 Vicryl in an interrupted fashion. The skin was closed with a 3-0 monocryl closure. The patient tolerated the procedure well. Sponge, lap, and needle counts were correct times three and the patient and baby were taken to recovery/postpartum room in stable condition.     Tu Pham MD  March 7, 2019  3:19 PM

## 2019-03-07 NOTE — ANESTHESIA PROCEDURE NOTES
Spinal Block    Performed by: Maya Perdomo MD  Authorized by: Maya Perdomo MD                 Spinal Procedure Note    Risk and benefits were discussed with the patient and plans are to proceed. Patient was placed in the seated position. The back was prepped at the lumbar region with Duraprep. 3 mL 1% lidocaine was used as local.    Epidural space identified with 17 gauge Touhy @ L3 - L4    A 25 g pencil point spinal needle was placed  and advanced until CSF was obtained. 1.6 mL 0.75% Spinal Marcaine with dextrose + 0.5 mg Duramorph was deposited into the CSF. - paresthesia.

## 2019-03-07 NOTE — ANESTHESIA POSTPROCEDURE EVALUATION
Procedure(s):   SECTION. Anesthesia Post Evaluation        Patient location during evaluation: PACU  Note status: Adequate. Level of consciousness: responsive to verbal stimuli and sleepy but conscious  Pain management: satisfactory to patient  Airway patency: patent  Anesthetic complications: no  Cardiovascular status: acceptable  Respiratory status: acceptable  Hydration status: acceptable  Comments: +Post-Anesthesia Evaluation and Assessment    Patient: Chance Rhodes MRN: [id number]  SSN: [id number]   YOB: 1985  Age: 28 y.o. Sex: female      Cardiovascular Function/Vital Signs    BP 94/58   Pulse 85   Temp 35.6 °C (96.1 °F)   Resp 16   Ht 5' 6\" (1.676 m)   Wt 74.8 kg (165 lb)   SpO2 100%   Breastfeeding? Unknown   BMI 26.63 kg/m²     Patient is status post Procedure(s):   SECTION. Nausea/Vomiting: Controlled. Postoperative hydration reviewed and adequate. Pain:  Pain Scale 1: Numeric (0 - 10) (19 1600)  Pain Intensity 1: 0 (19 1600)   Managed. Neurological Status:   Neuro (WDL): Within Defined Limits (19 1530)   At baseline. Mental Status and Level of Consciousness: Arousable. Pulmonary Status:   O2 Device: Room air (19 1530)   Adequate oxygenation and airway patent. Complications related to anesthesia: None    Post-anesthesia assessment completed. No concerns. Signed By: Gilda Parks MD    3/7/2019  Post anesthesia nausea and vomiting:  controlled      Visit Vitals  BP 94/58   Pulse 85   Temp 35.6 °C (96.1 °F)   Resp 16   Ht 5' 6\" (1.676 m)   Wt 74.8 kg (165 lb)   SpO2 100%   Breastfeeding?  Unknown   BMI 26.63 kg/m²

## 2019-03-07 NOTE — LACTATION NOTE
This note was copied from a baby's chart. @ 1 hour of life  Pt will successfully establish breastfeeding by feeding in response to early feeding cues or wake every 3h, will obtain deep latch, and will keep log of feedings/output. Taught to BF at hunger cues and or q 2-3 hrs and to offer 10-20 drops of hand expressed colostrum at any non-feeds. Breast Assessment  Left Breast: Medium  Left Nipple: Intact, Everted, Large, Moderate long  Right Breast: Medium  Right Nipple: Everted, Large, Intact, Moderate long  Breast- Feeding Assessment  Attends Breast-Feeding Classes: No  Breast-Feeding Experience: Yes(x 7 months)  Breast Trauma/Surgery: No  Type/Quality: Good  Lactation Consultant Visits  Breast-Feedings: Good   Mother/Infant Observation  Mother Observation: Alignment, Lets baby end feeding, Breast comfortable, Nipple round on release, Close hold, Holds breast, Recognizes feeding cues  Infant Observation: Audible swallows, Latches nipple and aereolae, Relaxed after feeding, Breast tissue moves, Lips flanged, lower, Rhythmic suck, Feeding cues, Lips flanged, upper, Opens mouth  LATCH Documentation  Latch: Grasps breast, tongue down, lips flanged, rhythmic sucking  Audible Swallowing: A few with stimulation  Type of Nipple: Everted (after stimulation)  Comfort (Breast/Nipple): Soft/non-tender  Hold (Positioning): Full assist, teach one side, mother does other, staff holds  LATCH Score: 8  While breastfeeding, reviewed breastfeeding basics:  How milk is made and normal  breastfeeding behaviors discussed. Supply and demand,  stomach size, early feeding cues, skin to skin bonding, positioning and baby led latch-on, assymetrical latch with signs of good, deep latch vs shallow, feeding frequency and duration, and log sheet for tracking infant feedings and output. Breastfeeding Booklet and Warm line information given.   Discussed typical  weight loss and the importance of infant weight checks with pediatrician 1 day post discharge. I/0 record initiated and explained how to know baby is getting enough. [facility] for outpatient follow up. Known to practice with NNP IBCLC's. Obtained her new pump from insurance company but plans to stay home this time, had lower supply concerns only because of back to work stress/time management. Expect success.

## 2019-03-07 NOTE — L&D DELIVERY NOTE
Delivery Summary  Patient: Janie Koroma             Circumcision:   NA-female  Additional Delivery Comments - scheduled RLTCS.  mL. Information for the patient's :  Esdras Elias [[id number]]       Labor Events:    Labor: No   Rupture Date: 3/7/2019   Rupture Time: 2:42 PM   Rupture Type AROM   Amniotic Fluid Volume: Large    Amniotic Fluid Description: Clear None   Induction: None       Augmentation: None   Labor Events: None     Cervical Ripening:     None     Delivery Events:  Episiotomy: None   Laceration(s): None     Repaired: None    Number of Repair Packets:     Suture Type and Size: None     Estimated Blood Loss (ml):  ml       Delivery Date: 3/7/2019    Delivery Time: 2:42 PM  Delivery Type: , Low Transverse  Sex:  Female     Gestational Age: 39w4d   Delivery Clinician:  Sera Lu  Living Status: Living   Delivery Location:              [facility]  One minute Five minutes Ten minutes   Skin color: 0   1        Heart rate: 2   2        Grimace: 2   2        Muscle tone: 2   2        Breathin   2        Totals: 8   9            Presentation: Vertex    Position:        Resuscitation Method:  Suctioning-bulb; Tactile Stimulation     Meconium Stained: None      Cord Information: 3 Vessels  Complications: None  Cord around:    Delayed cord clamping? Yes  Cord clamped date/time:   Disposition of Cord Blood: Lab    Blood Gases Sent?: No    Placenta:  Date/Time: 3/7/2019  2:43 PM  Removal: Manual Removal      Appearance: Intact; Normal     [place] Measurements:  Birth Weight: 3.785 kg      Birth Length: 51.4 cm      Head Circumference: 36.8 cm      Chest Circumference: 33 cm     Abdominal Girth: 32 cm    Other Providers:   ARMOND ANGLIN;ALEJANDRINA COATES;FAY CARRERA;;SERGEY MARAVILLA, Obstetrician;Primary Nurse;Primary [place] Nurse;Midwife;Nursery Nurse           Cord pH:  none    Episiotomy: None   Laceration(s): None     Estimated Blood Loss (ml):     Labor Events  Method: None      Augmentation: None   Cervical Ripening:       None        Operative Vaginal Delivery - none    Group B Strep: No results found for: GRBSEXT, GRBSEXT  Information for the patient's :  Angy Arriaza [[id number]]   No results found for: ABORH, PCTABR, PCTDIG, BILI, ABORHEXT, ABORH    No results found for: APH, APCO2, APO2, AHCO3, ABEC, ABDC, O2ST, EPHV, PCO2V, PO2V, HCO3V, EBEV, EBDV, SITE, RSCOM

## 2019-03-07 NOTE — ANESTHESIA PREPROCEDURE EVALUATION
Anesthetic History   No history of anesthetic complications            Review of Systems / Medical History  Patient summary reviewed, nursing notes reviewed and pertinent labs reviewed    Pulmonary  Within defined limits                 Neuro/Psych   Within defined limits           Cardiovascular  Within defined limits                Exercise tolerance: >4 METS     GI/Hepatic/Renal  Within defined limits              Endo/Other  Within defined limits           Other Findings   Comments: IUP, previous c/s           Physical Exam    Airway  Mallampati: II  TM Distance: 4 - 6 cm  Neck ROM: normal range of motion   Mouth opening: Normal     Cardiovascular  Regular rate and rhythm,  S1 and S2 normal,  no murmur, click, rub, or gallop             Dental  No notable dental hx       Pulmonary  Breath sounds clear to auscultation               Abdominal  GI exam deferred       Other Findings            Anesthetic Plan    ASA: 2  Anesthesia type: spinal      Post-op pain plan if not by surgeon: intrathecal opiates      Anesthetic plan and risks discussed with: Patient

## 2019-03-08 LAB
ERYTHROCYTE [DISTWIDTH] IN BLOOD BY AUTOMATED COUNT: 14.1 % (ref 11.5–14.5)
HCT VFR BLD AUTO: 30 % (ref 35–47)
HGB BLD-MCNC: 10.1 G/DL (ref 11.5–16)
MCH RBC QN AUTO: 31.6 PG (ref 26–34)
MCHC RBC AUTO-ENTMCNC: 33.7 G/DL (ref 30–36.5)
MCV RBC AUTO: 93.8 FL (ref 80–99)
NRBC # BLD: 0 K/UL (ref 0–0.01)
NRBC BLD-RTO: 0 PER 100 WBC
PLATELET # BLD AUTO: 122 K/UL (ref 150–400)
PMV BLD AUTO: 10.1 FL (ref 8.9–12.9)
RBC # BLD AUTO: 3.2 M/UL (ref 3.8–5.2)
WBC # BLD AUTO: 9.9 K/UL (ref 3.6–11)

## 2019-03-08 PROCEDURE — 86900 BLOOD TYPING SEROLOGIC ABO: CPT

## 2019-03-08 PROCEDURE — 85027 COMPLETE CBC AUTOMATED: CPT

## 2019-03-08 PROCEDURE — 85461 HEMOGLOBIN FETAL: CPT

## 2019-03-08 PROCEDURE — 74011250637 HC RX REV CODE- 250/637: Performed by: ANESTHESIOLOGY

## 2019-03-08 PROCEDURE — 36415 COLL VENOUS BLD VENIPUNCTURE: CPT

## 2019-03-08 PROCEDURE — 65410000002 HC RM PRIVATE OB

## 2019-03-08 PROCEDURE — 74011250637 HC RX REV CODE- 250/637: Performed by: OBSTETRICS & GYNECOLOGY

## 2019-03-08 RX ADMIN — DOCUSATE SODIUM 100 MG: 100 CAPSULE, LIQUID FILLED ORAL at 17:39

## 2019-03-08 RX ADMIN — IBUPROFEN 600 MG: 600 TABLET ORAL at 10:20

## 2019-03-08 RX ADMIN — IBUPROFEN 600 MG: 600 TABLET ORAL at 16:50

## 2019-03-08 RX ADMIN — DOCUSATE SODIUM 100 MG: 100 CAPSULE, LIQUID FILLED ORAL at 10:20

## 2019-03-08 RX ADMIN — OXYCODONE AND ACETAMINOPHEN 2 TABLET: 5; 325 TABLET ORAL at 16:50

## 2019-03-08 RX ADMIN — IBUPROFEN 600 MG: 600 TABLET ORAL at 02:50

## 2019-03-08 RX ADMIN — IBUPROFEN 600 MG: 600 TABLET ORAL at 23:26

## 2019-03-08 NOTE — PROGRESS NOTES
Duramorph Follow-Up Note    1 Day Post-Op sp Procedure(s):   SECTION. BP 99/58 (BP 1 Location: Left arm, BP Patient Position: Sitting)   Pulse 77   Temp 36.8 °C (98.2 °F)   Resp 18   Ht 5' 6\" (1.676 m)   Wt 74.8 kg (165 lb)   SpO2 99%   Breastfeeding? Unknown   BMI 26.63 kg/m² . Patient is POD-1 S/P intrathecal duramorph. Pain is well controlled  Patient reports no  fever, weakness or numbness. Epidural/spinal tap site is clean, dry and intact. No obvious Anesthesia complications noted although does complain of a mild headache. Will follow. Plan:    Pain management as per primary service.

## 2019-03-08 NOTE — PROGRESS NOTES
Post-Operative  Day 1    Edmund Peacock     Assessment: Post-Op day 1, stable    Plan:   1. Routine post-operative care   2. Acute blood loss anemia - hgb 12.7->10.1. Mild thrombocytopenia with plts 122. Will repeat cbc tomorrow. Iron on dc. Asx, VSS, UOP excellent   3. Rh neg - will need rhogam      Information for the patient's :  Ashley Ahn [[id number]]   , Low Transverse   Patient doing well without significant complaint. Nausea and vomiting resolved, tolerating diet, + flatus, azul in place. Vitals:  Visit Vitals  BP 99/58 (BP 1 Location: Left arm, BP Patient Position: Sitting)   Pulse 77   Temp 98.2 °F (36.8 °C)   Resp 18   Ht 5' 6\" (1.676 m)   Wt 74.8 kg (165 lb)   SpO2 99%   Breastfeeding? Unknown   BMI 26.63 kg/m²     Temp (24hrs), Av.6 °F (36.4 °C), Min:96.1 °F (35.6 °C), Max:98.4 °F (36.9 °C)      Last 24hr Input/Output:    Intake/Output Summary (Last 24 hours) at 3/8/2019 0909  Last data filed at 3/8/2019 0532  Gross per 24 hour   Intake 1358.75 ml   Output 5200 ml   Net -3841.25 ml          Exam:        Patient without distress. Lungs clear. Abdomen, bowel sounds present, soft, expected tenderness, fundus firm, incision c/d/i     Perineum normal lochia noted               Lower extremities are negative for swelling, cords or tenderness.     Labs:   Lab Results   Component Value Date/Time    WBC 9.9 2019 05:12 AM    WBC 11.3 (H) 2019 12:20 PM    WBC 8.7 2018 10:17 PM    HGB 10.1 (L) 2019 05:12 AM    HGB 12.7 2019 12:20 PM    HGB 13.0 2018 10:17 PM    HCT 30.0 (L) 2019 05:12 AM    HCT 37.1 2019 12:20 PM    HCT 38.2 2018 10:17 PM    PLATELET 886 (L)  05:12 AM    PLATELET 569  12:20 PM    PLATELET 698  10:17 PM       Recent Results (from the past 24 hour(s))   CBC WITH AUTOMATED DIFF    Collection Time: 19 12:20 PM   Result Value Ref Range    WBC 11.3 (H) 3.6 - 11.0 K/uL RBC 3.97 3.80 - 5.20 M/uL    HGB 12.7 11.5 - 16.0 g/dL    HCT 37.1 35.0 - 47.0 %    MCV 93.5 80.0 - 99.0 FL    MCH 32.0 26.0 - 34.0 PG    MCHC 34.2 30.0 - 36.5 g/dL    RDW 14.0 11.5 - 14.5 %    PLATELET 929 288 - 018 K/uL    MPV 10.0 8.9 - 12.9 FL    NRBC 0.0 0  WBC    ABSOLUTE NRBC 0.00 0.00 - 0.01 K/uL    NEUTROPHILS 72 32 - 75 %    LYMPHOCYTES 20 12 - 49 %    MONOCYTES 6 5 - 13 %    EOSINOPHILS 0 0 - 7 %    BASOPHILS 0 0 - 1 %    IMMATURE GRANULOCYTES 2 (H) 0.0 - 0.5 %    ABS. NEUTROPHILS 8.1 (H) 1.8 - 8.0 K/UL    ABS. LYMPHOCYTES 2.3 0.8 - 3.5 K/UL    ABS. MONOCYTES 0.7 0.0 - 1.0 K/UL    ABS. EOSINOPHILS 0.0 0.0 - 0.4 K/UL    ABS. BASOPHILS 0.0 0.0 - 0.1 K/UL    ABS. IMM.  GRANS. 0.2 (H) 0.00 - 0.04 K/UL    DF AUTOMATED     TYPE & SCREEN    Collection Time: 03/07/19 12:20 PM   Result Value Ref Range    Crossmatch Expiration 03/10/2019     ABO/Rh(D) A NEGATIVE     Antibody screen POS     Antibody ID Anti-D passively acquired     Unit number [id number]     Blood component type  LRIR     Unit division 00     Status of unit REL FROM [facility]     Crossmatch result Compatible     Unit number [id number]     [facility]     Unit division 00     Status of unit REL FROM [facility]     Crossmatch result Compatible    RH IMMUNE GLOBULIN EVAL-LAB ORDER    Collection Time: 03/08/19  5:12 AM   Result Value Ref Range    ABO/Rh(D) A NEGATIVE     Fetal screen NEG     WEAK D NEG     Unit number [id number]     Blood component type RH IMMUNE GLOBULIN     Unit division 00     Status of unit ALLOCATED    CBC W/O DIFF    Collection Time: 03/08/19  5:12 AM   Result Value Ref Range    WBC 9.9 3.6 - 11.0 K/uL    RBC 3.20 (L) 3.80 - 5.20 M/uL    HGB 10.1 (L) 11.5 - 16.0 g/dL    HCT 30.0 (L) 35.0 - 47.0 %    MCV 93.8 80.0 - 99.0 FL    MCH 31.6 26.0 - 34.0 PG    MCHC 33.7 30.0 - 36.5 g/dL    RDW 14.1 11.5 - 14.5 %    PLATELET 987 (L) 234 - 400 K/uL    MPV 10.1 8.9 - 12.9 FL    NRBC 0.0 0  WBC    ABSOLUTE NRBC 0. 00 0.00 - 0.01 K/uL

## 2019-03-08 NOTE — PROGRESS NOTES
Pt requesting to sleep and have the infant in the nursery until 2100. Informed pt that we would be switching nurses and she asked if we could not wake her for report.

## 2019-03-08 NOTE — ROUTINE PROCESS
Bedside shift change report given to KRISTEN Blanco (oncoming nurse) by MARCUS Pizarro (offgoing nurse). Report given with SBAR.

## 2019-03-08 NOTE — LACTATION NOTE
This note was copied from a baby's chart. 20 hours of life  9 x at breast  2 wet  4 stools    Breast Assessment  Left Breast: Medium  Left Nipple: Intact, Everted, Large, Moderate long  Right Breast: Medium  Right Nipple: Everted, Large, Intact, Moderate long  Breast- Feeding Assessment  Attends Breast-Feeding Classes: No  Breast-Feeding Experience: Yes(x 7 months)  Breast Trauma/Surgery: No  Type/Quality: Good  Lactation Consultant Visits  Breast-Feedings: Good   Mother/Infant Observation  Mother Observation: Alignment, Lets baby end feeding, Breast comfortable, Nipple round on release, Close hold, Holds breast, Recognizes feeding cues  Infant Observation: Audible swallows, Latches nipple and aereolae, Relaxed after feeding, Breast tissue moves, Lips flanged, lower, Rhythmic suck, Feeding cues, Lips flanged, upper, Opens mouth  LATCH Documentation  Latch: Grasps breast, tongue down, lips flanged, rhythmic sucking  Audible Swallowing: A few with stimulation  Type of Nipple: Everted (after stimulation)  Comfort (Breast/Nipple): Soft/non-tender  Hold (Positioning): No assist from staff, mother able to position/hold infant  LATCH Score: 9  Continue observation of feeding cues and obtain deep latch past longer bhavin nipple. Enjoying baby. Up ad negro in room with am care during [facility] rounds. Expect success. 1140  Addendum: Mother calls with request for deeper latch assistance. Anterior lip frenulum somewhat restrictive. Upper lip flanging is challenging. Posterior sub lingual frenulum does not appear restrictive, tongue extends past lower gum tissue. Once mother positioned up in chair with nursing pillow/alignment with baby in cross cradle hold/asymetrical latch achieved with only a few seconds of discomfort before easing into a comfortable rhythmic latch/pause and suckle. 45 minute session. Nipple round at base upon release. Lanolin encouraged after colostrum air drys. Mother pleased with feeding.   Advise asking pediatrician about upper lip tie/familial trait on her father's side/she & all her siblings had. Continue to alternate and find positions of comfort.

## 2019-03-09 VITALS
RESPIRATION RATE: 16 BRPM | TEMPERATURE: 97.9 F | BODY MASS INDEX: 26.52 KG/M2 | HEART RATE: 85 BPM | SYSTOLIC BLOOD PRESSURE: 100 MMHG | WEIGHT: 165 LBS | OXYGEN SATURATION: 100 % | HEIGHT: 66 IN | DIASTOLIC BLOOD PRESSURE: 63 MMHG

## 2019-03-09 LAB
ERYTHROCYTE [DISTWIDTH] IN BLOOD BY AUTOMATED COUNT: 14.3 % (ref 11.5–14.5)
HCT VFR BLD AUTO: 31 % (ref 35–47)
HGB BLD-MCNC: 10.4 G/DL (ref 11.5–16)
MCH RBC QN AUTO: 32.1 PG (ref 26–34)
MCHC RBC AUTO-ENTMCNC: 33.5 G/DL (ref 30–36.5)
MCV RBC AUTO: 95.7 FL (ref 80–99)
NRBC # BLD: 0 K/UL (ref 0–0.01)
NRBC BLD-RTO: 0 PER 100 WBC
PLATELET # BLD AUTO: 139 K/UL (ref 150–400)
PMV BLD AUTO: 9.9 FL (ref 8.9–12.9)
RBC # BLD AUTO: 3.24 M/UL (ref 3.8–5.2)
WBC # BLD AUTO: 10.2 K/UL (ref 3.6–11)

## 2019-03-09 PROCEDURE — 3E0334Z INTRODUCTION OF SERUM, TOXOID AND VACCINE INTO PERIPHERAL VEIN, PERCUTANEOUS APPROACH: ICD-10-PCS | Performed by: OBSTETRICS & GYNECOLOGY

## 2019-03-09 PROCEDURE — 74011250637 HC RX REV CODE- 250/637: Performed by: ANESTHESIOLOGY

## 2019-03-09 PROCEDURE — 74011250636 HC RX REV CODE- 250/636: Performed by: OBSTETRICS & GYNECOLOGY

## 2019-03-09 PROCEDURE — 85027 COMPLETE CBC AUTOMATED: CPT

## 2019-03-09 PROCEDURE — 74011250637 HC RX REV CODE- 250/637: Performed by: OBSTETRICS & GYNECOLOGY

## 2019-03-09 PROCEDURE — 36415 COLL VENOUS BLD VENIPUNCTURE: CPT

## 2019-03-09 RX ORDER — OXYCODONE AND ACETAMINOPHEN 5; 325 MG/1; MG/1
1 TABLET ORAL
Qty: 30 TAB | Refills: 0 | Status: SHIPPED | OUTPATIENT
Start: 2019-03-09 | End: 2019-03-19

## 2019-03-09 RX ORDER — IBUPROFEN 800 MG/1
800 TABLET ORAL
Qty: 30 TAB | Refills: 0 | Status: SHIPPED | OUTPATIENT
Start: 2019-03-09 | End: 2021-05-04

## 2019-03-09 RX ADMIN — IBUPROFEN 600 MG: 600 TABLET ORAL at 11:54

## 2019-03-09 RX ADMIN — DOCUSATE SODIUM 100 MG: 100 CAPSULE, LIQUID FILLED ORAL at 09:05

## 2019-03-09 RX ADMIN — OXYCODONE AND ACETAMINOPHEN 2 TABLET: 5; 325 TABLET ORAL at 09:05

## 2019-03-09 RX ADMIN — OXYCODONE AND ACETAMINOPHEN 2 TABLET: 5; 325 TABLET ORAL at 14:34

## 2019-03-09 RX ADMIN — IBUPROFEN 600 MG: 600 TABLET ORAL at 05:39

## 2019-03-09 RX ADMIN — HUMAN RHO(D) IMMUNE GLOBULIN 0.3 MG: 300 INJECTION, SOLUTION INTRAMUSCULAR at 05:37

## 2019-03-09 NOTE — LACTATION NOTE
Couplet Interdisciplinary Rounds     MATERNAL    Daily Goal:     Influenza screening completed: YES   Tdap screening completed: YES   Rhogam Given:YES  MMR Given:N/A    VTE Prophylaxis: Not indicated, per Provider order    EPDS:            Patient Name: Umair Ybarra Diagnosis: Pregnancy [Z34.90]   Date of Admission: 3/7/2019 LOS: 2  Gestational Age: Gestational Age: <None>       Daily Goal:     Birth Weight: No birth weight on file.  Current Weight: Weight: 74.8 kg (165 lb)  % of Weight Change: Birth weight not on file    Feeding:   Metabolic Screen: YES and Initial    Hepatitis B:  YES and On MAR    Discharge Bili:  YES  Car Seat Trial, if needed:  N/A      Patient/Family Teaching Needs:     Days before discharge: Ready for discharge    In Attendance:  Nursing and Physician

## 2019-03-09 NOTE — PROGRESS NOTES
Post-Operative  Day 2    Tatiana Arch       Assessment: Post-Op day 2, doing well, desires dc home today  Mild anemia - hgb 10.4, start iron on dc  Mild thrombocytopenia - improved to 136 today    Plan:   1. Discharge home today  2. Follow up in office in 6 weeks with Brit Copeland MD  3. Post partum activity/wound care advised, diet as tolerated  4. Discharge Medications: ibuprofen, percocet and medications prior to admission      Information for the patient's :  Jose Rosales [[id number]]   , Low Transverse   Patient doing well without significant complaint. Tolerating diet, passing flatus, voiding and ambulating without difficulty    Vitals:  Visit Vitals  BP 98/63   Pulse 76   Temp 97.9 °F (36.6 °C)   Resp 16   Ht 5' 6\" (1.676 m)   Wt 74.8 kg (165 lb)   SpO2 100%   Breastfeeding? Unknown   BMI 26.63 kg/m²     Temp (24hrs), Av.3 °F (36.8 °C), Min:97.9 °F (36.6 °C), Max:98.8 °F (37.1 °C)        Exam:        Patient without distress. Abdomen, bowel sounds present, soft, expected tenderness, fundus firm                Wound incision clean, dry and intact               Lower extremities are negative for swelling, cords or tenderness.     Labs:   Lab Results   Component Value Date/Time    WBC 10.2 2019 05:45 AM    WBC 9.9 2019 05:12 AM    WBC 11.3 (H) 2019 12:20 PM    WBC 8.7 2018 10:17 PM    HGB 10.4 (L) 2019 05:45 AM    HGB 10.1 (L) 2019 05:12 AM    HGB 12.7 2019 12:20 PM    HGB 13.0 2018 10:17 PM    HCT 31.0 (L) 2019 05:45 AM    HCT 30.0 (L) 2019 05:12 AM    HCT 37.1 2019 12:20 PM    HCT 38.2 2018 10:17 PM    PLATELET 545 (L)  05:45 AM    PLATELET 519 (L)  05:12 AM    PLATELET 739 9916 12:20 PM    PLATELET 447  10:17 PM       Recent Results (from the past 24 hour(s))   CBC W/O DIFF    Collection Time: 19  5:45 AM   Result Value Ref Range    WBC 10.2 3.6 - 11.0 K/uL    RBC 3.24 (L) 3.80 - 5.20 M/uL    HGB 10.4 (L) 11.5 - 16.0 g/dL    HCT 31.0 (L) 35.0 - 47.0 %    MCV 95.7 80.0 - 99.0 FL    MCH 32.1 26.0 - 34.0 PG    MCHC 33.5 30.0 - 36.5 g/dL    RDW 14.3 11.5 - 14.5 %    PLATELET 494 (L) 870 - 400 K/uL    MPV 9.9 8.9 - 12.9 FL    NRBC 0.0 0  WBC    ABSOLUTE NRBC 0.00 0.00 - 0.01 K/uL

## 2019-03-09 NOTE — DISCHARGE SUMMARY
Obstetrical Discharge Summary     Name: Mary Kate Brewer MRN: [id number]  SSN: [id number]    YOB: 1985  Age: 28 y.o. Sex: female      Admit Date: 3/7/2019    Discharge Date: 3/9/2019     Admitting Physician: Yulia James MD     Attending Physician:  Pravin José MD     Admission Diagnoses: Pregnancy [Z34.90]    Procedure Performed:  Procedure(s):   SECTION  Surgical      Discharge Diagnoses:   Information for the patient's :  Kenan Duel [[id number]]   Delivery of a 3.785 kg female infant via , Low Transverse on 3/7/2019 at 2:42 PM  by . Apgars were 8 and 9. Additional Diagnoses:   Hospital Problems  Date Reviewed: 3/7/2019          Codes Class Noted POA    Pregnancy ICD-10-CM: Z34.90  ICD-9-CM: V22.2  3/7/2019 Unknown             Lab Results   Component Value Date/Time    Rubella, External immune 3.93 2018    GrBStrep, External negative 02/15/2019       Hospital Course: Normal hospital course following the delivery. Patient Disposition: Home      Followup Care:  Discharge Condition: good  No sex for 6 weeks, No driving while on analgesics and No heavy lifting for 6 weeks  Regular Diet  Keep wound clean and dry    Patient Instructions:   Current Discharge Medication List      START taking these medications    Details   ibuprofen (MOTRIN) 800 mg tablet Take 1 Tab by mouth every eight (8) hours as needed for Pain. Qty: 30 Tab, Refills: 0      oxyCODONE-acetaminophen (PERCOCET) 5-325 mg per tablet Take 1 Tab by mouth every four (4) hours as needed for Pain for up to 10 days. Max Daily Amount: 6 Tabs. Qty: 30 Tab, Refills: 0    Associated Diagnoses: Post-op pain             Reference my discharge instructions.     Follow-up Appointments   Procedures    FOLLOW UP VISIT Appointment in: 6 Weeks     Standing Status:   Standing     Number of Occurrences:   1     Order Specific Question:   Appointment in     Answer:   6 Weeks        Signed By:  Gail Barnes MD Viviana     March 9, 2019

## 2019-03-09 NOTE — DISCHARGE INSTRUCTIONS
POST DELIVERY DISCHARGE INSTRUCTIONS    Name: Yanira Dunne  YOB: 1985  Primary Diagnosis: Active Problems:    Pregnancy (3/7/2019)        General:     Diet/Diet Restrictions:  · Eight 8-ounce glasses of fluid daily (water, juices); avoid excessive caffeine intake. · Meals/snacks as desired which are high in fiber and carbohydrates and low in fat and cholesterol. Physical Activity / Restrictions / Safety:     · Avoid heavy lifting, no more that 8 lbs. For 2-3 weeks;   · Limit use of stairs to 2 times daily for the first week home. · No driving for one week. · Avoid intercourse 4-6 weeks, no douching or tampon use. · Check with obstetrician before starting or resuming an exercise program.      Discharge Instructions/Special Treatment/Home Care Needs:     · Continue prenatal vitamins. · Continue to use squirt bottle with warm water on your episiotomy after each bathroom use until bleeding stops. · If steri-strips applied to your incision, remove in 7-10 days. Call your doctor for the following:     · Fever over 101 degrees by mouth. · Vaginal bleeding heavier than a normal menstrual period or clots larger than a golf ball. · Red streaks or increased swelling of legs, painful red streaks on your breast.  · Painful urination, constipation and increased pain or swelling or discharge with your incision. · If you feel extremely anxious or overwhelmed. · If you have thoughts of harming yourself and/or your baby. Pain Management:     · Take Acetaminophen (Tylenol) or Ibuprofen (Advil, Motrin), as directed for pain. · Use a warm Sitz bath 3 times daily to relieve episiotomy or hemorrhoidal discomfort. · For hemorrhoidal discomfort, use Tucks and Anusol cream as needed and directed. · Heating pad to  incision as needed.      Follow-Up Care:     Appointment with MD:   Follow-up Appointments   Procedures    FOLLOW UP VISIT Appointment in: 6 Weeks     Standing Status:   Standing Number of Occurrences:   1     Order Specific Question:   Appointment in     Answer:   6 Weeks     Telephone number: [phone]    Signed By: Minor Hernandez MD                                                                                                   Date: 3/9/2019 Time: 8:40 AM

## 2019-03-09 NOTE — ROUTINE PROCESS
Shift change report given to KRISTEN ShieldsRN (oncoming nurse) by MOODY Oswald-MNN (offgoing nurse). Report included the following information SBAR, Procedure Summary, Intake/Output, MAR and Recent Results.

## 2019-03-10 LAB
ABO + RH BLD: NORMAL
BLD PROD TYP BPU: NORMAL
BPU ID: NORMAL
FETAL SCREEN,FMHS: NORMAL
STATUS OF UNIT,%ST: NORMAL
UNIT DIVISION, %UDIV: 0
WEAK D AG RBC QL: NORMAL

## 2021-03-15 ENCOUNTER — OFFICE VISIT (OUTPATIENT)
Dept: SURGERY | Age: 36
End: 2021-03-15
Payer: COMMERCIAL

## 2021-03-15 VITALS
HEART RATE: 93 BPM | OXYGEN SATURATION: 100 % | TEMPERATURE: 97.3 F | RESPIRATION RATE: 16 BRPM | DIASTOLIC BLOOD PRESSURE: 83 MMHG | WEIGHT: 114 LBS | BODY MASS INDEX: 18.32 KG/M2 | HEIGHT: 66 IN | SYSTOLIC BLOOD PRESSURE: 114 MMHG

## 2021-03-15 DIAGNOSIS — K43.9 VENTRAL HERNIA WITHOUT OBSTRUCTION OR GANGRENE: Primary | ICD-10-CM

## 2021-03-15 PROCEDURE — 99204 OFFICE O/P NEW MOD 45 MIN: CPT | Performed by: SURGERY

## 2021-03-15 RX ORDER — CETIRIZINE HCL 10 MG
10 TABLET ORAL
COMMUNITY
End: 2021-11-11

## 2021-03-15 NOTE — PROGRESS NOTES
HISTORY OF PRESENT ILLNESS  Raj Driscoll is a 38 y.o. female. Hernia 8 years  occ tender  No nausea  Worse with lifting    Began with her first pregnancy. Became worse after second pregnancy. She is done having children. Appetite  Wt stable        Possible Hernia  The history is provided by the patient and spouse. This is a chronic problem. The current episode started more than 1 week ago. The problem occurs constantly. The problem has been gradually worsening. Associated symptoms include abdominal pain. Pertinent negatives include no chest pain, no headaches and no shortness of breath. The symptoms are aggravated by exertion. The symptoms are relieved by rest. The treatment provided no relief. Review of Systems   Constitutional: Negative for chills, fever and weight loss. HENT: Negative for ear pain. Eyes: Negative for pain. Respiratory: Negative for shortness of breath. Cardiovascular: Negative for chest pain. Gastrointestinal: Positive for abdominal pain. Negative for blood in stool. Genitourinary: Negative for hematuria. Musculoskeletal: Negative for joint pain. Skin: Negative for rash. Neurological: Negative for dizziness, focal weakness, seizures and headaches. Endo/Heme/Allergies: Does not bruise/bleed easily. Psychiatric/Behavioral: The patient does not have insomnia. Physical Exam  Constitutional:       General: She is not in acute distress. Appearance: She is well-developed. She is not diaphoretic. HENT:      Head: Normocephalic and atraumatic. Mouth/Throat:      Pharynx: No oropharyngeal exudate. Eyes:      Pupils: Pupils are equal, round, and reactive to light. Neck:      Musculoskeletal: Normal range of motion. Trachea: No tracheal deviation. Cardiovascular:      Rate and Rhythm: Normal rate and regular rhythm. Heart sounds: Normal heart sounds. No murmur.    Pulmonary:      Effort: Pulmonary effort is normal. No respiratory distress. Breath sounds: Normal breath sounds. No wheezing. Abdominal:      General: Bowel sounds are normal. There is no distension. Palpations: Abdomen is soft. There is no mass. Tenderness: There is abdominal tenderness (mild). There is no guarding or rebound. Musculoskeletal: Normal range of motion. General: No tenderness. Lymphadenopathy:      Cervical: No cervical adenopathy. Skin:     General: Skin is warm. Findings: No erythema or rash. Neurological:      Mental Status: She is alert and oriented to person, place, and time. Psychiatric:         Behavior: Behavior normal.         ASSESSMENT and PLAN    ICD-10-CM ICD-9-CM    1. Ventral hernia without obstruction or gangrene  K43.9 553.20        I have recommended to Claire Oro that we proceed with surgery. Very high risk of complication including recurrence if we do not recreate the abdominal wall at the time of hernia repair. I had an extensive discussion with her and her  regarding the risks, benefits, and alternatives of proceeding with a(n) Laparoscopic Ventral Hernia Repair with Mesh, with separation components, robot-assisted. Risks,benefits, and alternatives were discussed including the risk of anesthesia, bleeding, infection, injury to bowel, chronic pain, and recurrence were discussed. The patient was counseled at length about the risks of kathi Covid-19 during their perioperative period and any recovery window from their procedure. She was made aware that kathi Covid-19  may worsen their prognosis for recovering from their procedure and lend to a higher morbidity and/or mortality risk. All material risks, benefits, and reasonable alternatives including postponing the procedure were discussed. She wishes to proceed with the procedure at this time. She is in agreement to proceed. All questions were answered. We will schedule her at her earliest convenience.

## 2021-03-15 NOTE — PROGRESS NOTES
1. Have you been to the ER, urgent care clinic since your last visit? Hospitalized since your last visit? No    2. Have you seen or consulted any other health care providers outside of the [address] [place] [address] since your last visit? Include any pap smears or colon screening.   No

## 2021-04-22 RX ORDER — BISMUTH SUBSALICYLATE 262 MG
1 TABLET,CHEWABLE ORAL DAILY
COMMUNITY
End: 2021-11-11

## 2021-04-22 NOTE — PERIOP NOTES
[facility]  Preoperative Instructions     COVID 4/29/21 0700 - 1100 am   [place]    Surgery Date 5/3/21          Time of Jose Huertas  Contact # [phone]    6. On the day of your surgery, please report to the Surgical Services Registration Desk and sign in at your designated time. The Surgery Center is located to the right of the Emergency Room. 2. You must have someone with you to drive you home. You should not drive a car for 24 hours following surgery. Please make arrangements for a friend or family member to stay with you for the first 24 hours after your surgery. 3. Do not have anything to eat or drink (including water, gum, mints, coffee, juice) after midnight ?5/2/21   . ? This may not apply to medications prescribed by your physician. ?(Please note below the special instructions with medications to take the morning of your procedure.)    4. We recommend you do not drink any alcoholic beverages for 24 hours before and after your surgery. 5. Contact your surgeons office for instructions on the following medications: non-steroidal anti-inflammatory drugs (i.e. Advil, Aleve), vitamins, and supplements. (Some surgeons will want you to stop these medications prior to surgery and others may allow you to take them)  **If you are currently taking Plavix, Coumadin, Aspirin and/or other blood-thinning agents, contact your surgeon for instructions. ** Your surgeon will partner with the physician prescribing these medications to determine if it is safe to stop or if you need to continue taking. Please do not stop taking these medications without instructions from your surgeon    6. Wear comfortable clothes. Wear glasses instead of contacts. Do not bring any money or jewelry. Please bring picture ID, insurance card, and any prearranged co-payment or hospital payment. Do not wear make-up, particularly mascara the morning of your surgery.   Do not wear nail polish, particularly if you are having foot /hand surgery. Wear your hair loose or down, no ponytails, buns, angela pins or clips. All body piercings must be removed. Please shower with antibacterial soap for three consecutive days before and on the morning of surgery, but do not apply any lotions, powders or deodorants after the shower on the day of surgery. Please use a fresh towels after each shower. Please sleep in clean clothes and change bed linens the night before surgery. Please do not shave for 48 hours prior to surgery. Shaving of the face is acceptable. 7. You should understand that if you do not follow these instructions your surgery may be cancelled. If your physical condition changes (I.e. fever, cold or flu) please contact your surgeon as soon as possible. 8. It is important that you be on time. If a situation occurs where you may be late, please call [phone] (OR Holding Area). 9. If you have any questions and or problems, please call [phone] (Pre-admission Testing). 10. Your surgery time may be subject to change. You will receive a phone call the evening prior if your time changes. 11.  If having outpatient surgery, you must have someone to drive you here, stay with you during the duration of your stay, and to drive you home at time of discharge. Special Instructions: Follow Dr. Chasidy Vázquez instructions regarding Motrin prior to surgery. TAKE ALL MEDICATIONS DAY OF SURGERY EXCEPT: None      I understand a pre-operative phone call will be made to verify my surgery time. In the event that I am not available, I give permission for a message to be left on my answering service and/or with another person?   yes         ___________________      __________   _________    (Signature of Patient)             (Witness)                (Date and Time)

## 2021-04-27 ENCOUNTER — TELEPHONE (OUTPATIENT)
Dept: SURGERY | Age: 36
End: 2021-04-27

## 2021-04-28 NOTE — TELEPHONE ENCOUNTER
Spoke to pt she had some questions pt wanted to know if she should start taking colace before her surgery I informed her that if she is regular then there is no need she was prescribed Miralax for after surgery so I informed pt to take that as prescribed. Pt wanted to know if the provider gave binders after surgery told pt that I would ask the provider and give her a call back as soon as I got the answer.

## 2021-04-29 ENCOUNTER — HOSPITAL ENCOUNTER (OUTPATIENT)
Dept: PREADMISSION TESTING | Age: 36
Discharge: HOME | End: 2021-04-29
Payer: COMMERCIAL

## 2021-04-29 LAB — SARS-COV-2, COV2: NORMAL

## 2021-04-29 PROCEDURE — U0003 INFECTIOUS AGENT DETECTION BY NUCLEIC ACID (DNA OR RNA); SEVERE ACUTE RESPIRATORY SYNDROME CORONAVIRUS 2 (SARS-COV-2) (CORONAVIRUS DISEASE [COVID-19]), AMPLIFIED PROBE TECHNIQUE, MAKING USE OF HIGH THROUGHPUT TECHNOLOGIES AS DESCRIBED BY CMS-2020-01-R: HCPCS

## 2021-04-29 NOTE — TELEPHONE ENCOUNTER
Thank you. Spoke to pt and informed her she will be receiving a binder after surgery pt expressed understanding.

## 2021-04-29 NOTE — TELEPHONE ENCOUNTER
Pt wants to know if you provide binders after surgery she is having Laparoscopic Ventral Hernia Repair with Mesh, with separation components, robot-assisted.

## 2021-04-30 LAB — SARS-COV-2, COV2NT: NOT DETECTED

## 2021-05-03 ENCOUNTER — ANESTHESIA EVENT (OUTPATIENT)
Dept: SURGERY | Age: 36
End: 2021-05-03
Payer: COMMERCIAL

## 2021-05-03 ENCOUNTER — ANESTHESIA (OUTPATIENT)
Dept: SURGERY | Age: 36
End: 2021-05-03
Payer: COMMERCIAL

## 2021-05-03 ENCOUNTER — HOSPITAL ENCOUNTER (OUTPATIENT)
Age: 36
Setting detail: OUTPATIENT SURGERY
Discharge: HOME | End: 2021-05-03
Attending: SURGERY | Admitting: SURGERY
Payer: COMMERCIAL

## 2021-05-03 VITALS
OXYGEN SATURATION: 99 % | BODY MASS INDEX: 18.18 KG/M2 | WEIGHT: 113.1 LBS | SYSTOLIC BLOOD PRESSURE: 102 MMHG | HEIGHT: 66 IN | RESPIRATION RATE: 16 BRPM | DIASTOLIC BLOOD PRESSURE: 49 MMHG | TEMPERATURE: 97.8 F | HEART RATE: 62 BPM

## 2021-05-03 DIAGNOSIS — R52 PAIN: Primary | ICD-10-CM

## 2021-05-03 LAB — HCG UR QL: NEGATIVE

## 2021-05-03 PROCEDURE — 77030038613 HC SUT PDS STRATA SPIRL J&J -B: Performed by: SURGERY

## 2021-05-03 PROCEDURE — 49652 PR LAP, VENTRAL HERNIA REPAIR,REDUCIBLE: CPT | Performed by: SURGERY

## 2021-05-03 PROCEDURE — 77030010507 HC ADH SKN DERMBND J&J -B: Performed by: SURGERY

## 2021-05-03 PROCEDURE — 77030035277 HC OBTRTR BLDELSS DISP INTU -B: Performed by: SURGERY

## 2021-05-03 PROCEDURE — 76210000022 HC REC RM PH II 1.5 TO 2 HR: Performed by: SURGERY

## 2021-05-03 PROCEDURE — 76060000035 HC ANESTHESIA 2 TO 2.5 HR: Performed by: SURGERY

## 2021-05-03 PROCEDURE — 74011000250 HC RX REV CODE- 250: Performed by: NURSE ANESTHETIST, CERTIFIED REGISTERED

## 2021-05-03 PROCEDURE — 77030016151 HC PROTCTR LNS DFOG COVD -B: Performed by: SURGERY

## 2021-05-03 PROCEDURE — 77030002986 HC SUT PROL J&J -A: Performed by: SURGERY

## 2021-05-03 PROCEDURE — 74011250637 HC RX REV CODE- 250/637: Performed by: SURGERY

## 2021-05-03 PROCEDURE — 2709999900 HC NON-CHARGEABLE SUPPLY: Performed by: SURGERY

## 2021-05-03 PROCEDURE — 77030020703 HC SEAL CANN DISP INTU -B: Performed by: SURGERY

## 2021-05-03 PROCEDURE — 77030018673: Performed by: SURGERY

## 2021-05-03 PROCEDURE — 74011000250 HC RX REV CODE- 250: Performed by: SURGERY

## 2021-05-03 PROCEDURE — 74011250637 HC RX REV CODE- 250/637: Performed by: NURSE ANESTHETIST, CERTIFIED REGISTERED

## 2021-05-03 PROCEDURE — C1781 MESH (IMPLANTABLE): HCPCS | Performed by: SURGERY

## 2021-05-03 PROCEDURE — 77030002933 HC SUT MCRYL J&J -A: Performed by: SURGERY

## 2021-05-03 PROCEDURE — 74011250636 HC RX REV CODE- 250/636: Performed by: NURSE ANESTHETIST, CERTIFIED REGISTERED

## 2021-05-03 PROCEDURE — 77030026438 HC STYL ET INTUB CARD -A: Performed by: ANESTHESIOLOGY

## 2021-05-03 PROCEDURE — 76210000006 HC OR PH I REC 0.5 TO 1 HR: Performed by: SURGERY

## 2021-05-03 PROCEDURE — 77030008684 HC TU ET CUF COVD -B: Performed by: ANESTHESIOLOGY

## 2021-05-03 PROCEDURE — 77030002966 HC SUT PDS J&J -A: Performed by: SURGERY

## 2021-05-03 PROCEDURE — 15734 MUSCLE-SKIN GRAFT TRUNK: CPT | Performed by: SURGERY

## 2021-05-03 PROCEDURE — 74011250636 HC RX REV CODE- 250/636: Performed by: ANESTHESIOLOGY

## 2021-05-03 PROCEDURE — 76010000876 HC OR TIME 2 TO 2.5HR INTENSV - TIER 2: Performed by: SURGERY

## 2021-05-03 PROCEDURE — 77030036554: Performed by: SURGERY

## 2021-05-03 PROCEDURE — 2709999900 HC NON-CHARGEABLE SUPPLY

## 2021-05-03 PROCEDURE — 81025 URINE PREGNANCY TEST: CPT

## 2021-05-03 PROCEDURE — 74011250637 HC RX REV CODE- 250/637: Performed by: ANESTHESIOLOGY

## 2021-05-03 PROCEDURE — 77030008756 HC TU IRR SUC STRY -B: Performed by: SURGERY

## 2021-05-03 PROCEDURE — 77030040361 HC SLV COMPR DVT MDII -B: Performed by: SURGERY

## 2021-05-03 PROCEDURE — S2900 ROBOTIC SURGICAL SYSTEM: HCPCS | Performed by: SURGERY

## 2021-05-03 PROCEDURE — 74011250636 HC RX REV CODE- 250/636: Performed by: SURGERY

## 2021-05-03 PROCEDURE — 77030035236 HC SUT PDS STRATFX BARB J&J -B: Performed by: SURGERY

## 2021-05-03 DEVICE — MESH HERN W20XL30CM RECT PREPERI BIOMATERIAL COMP POLYPR: Type: IMPLANTABLE DEVICE | Site: ABDOMEN | Status: FUNCTIONAL

## 2021-05-03 RX ORDER — NEOSTIGMINE METHYLSULFATE 1 MG/ML
INJECTION, SOLUTION INTRAVENOUS AS NEEDED
Status: DISCONTINUED | OUTPATIENT
Start: 2021-05-03 | End: 2021-05-03 | Stop reason: HOSPADM

## 2021-05-03 RX ORDER — FENTANYL CITRATE 50 UG/ML
25 INJECTION, SOLUTION INTRAMUSCULAR; INTRAVENOUS
Status: DISCONTINUED | OUTPATIENT
Start: 2021-05-03 | End: 2021-05-03 | Stop reason: HOSPADM

## 2021-05-03 RX ORDER — SODIUM CHLORIDE 0.9 % (FLUSH) 0.9 %
5-40 SYRINGE (ML) INJECTION AS NEEDED
Status: DISCONTINUED | OUTPATIENT
Start: 2021-05-03 | End: 2021-05-03 | Stop reason: HOSPADM

## 2021-05-03 RX ORDER — HYDROMORPHONE HYDROCHLORIDE 1 MG/ML
0.2 INJECTION, SOLUTION INTRAMUSCULAR; INTRAVENOUS; SUBCUTANEOUS
Status: DISCONTINUED | OUTPATIENT
Start: 2021-05-03 | End: 2021-05-03 | Stop reason: HOSPADM

## 2021-05-03 RX ORDER — BETHANECHOL CHLORIDE 10 MG/1
10 TABLET ORAL
Status: DISCONTINUED | OUTPATIENT
Start: 2021-05-03 | End: 2021-05-04 | Stop reason: HOSPADM

## 2021-05-03 RX ORDER — LIDOCAINE HYDROCHLORIDE 10 MG/ML
0.1 INJECTION, SOLUTION EPIDURAL; INFILTRATION; INTRACAUDAL; PERINEURAL AS NEEDED
Status: DISCONTINUED | OUTPATIENT
Start: 2021-05-03 | End: 2021-05-03 | Stop reason: HOSPADM

## 2021-05-03 RX ORDER — MIDAZOLAM HYDROCHLORIDE 1 MG/ML
INJECTION, SOLUTION INTRAMUSCULAR; INTRAVENOUS AS NEEDED
Status: DISCONTINUED | OUTPATIENT
Start: 2021-05-03 | End: 2021-05-03 | Stop reason: HOSPADM

## 2021-05-03 RX ORDER — SODIUM CHLORIDE, SODIUM LACTATE, POTASSIUM CHLORIDE, CALCIUM CHLORIDE 600; 310; 30; 20 MG/100ML; MG/100ML; MG/100ML; MG/100ML
25 INJECTION, SOLUTION INTRAVENOUS CONTINUOUS
Status: DISCONTINUED | OUTPATIENT
Start: 2021-05-03 | End: 2021-05-03 | Stop reason: HOSPADM

## 2021-05-03 RX ORDER — ACETAMINOPHEN 325 MG/1
650 TABLET ORAL ONCE
Status: DISCONTINUED | OUTPATIENT
Start: 2021-05-03 | End: 2021-05-03 | Stop reason: HOSPADM

## 2021-05-03 RX ORDER — SCOLOPAMINE TRANSDERMAL SYSTEM 1 MG/1
PATCH, EXTENDED RELEASE TRANSDERMAL AS NEEDED
Status: DISCONTINUED | OUTPATIENT
Start: 2021-05-03 | End: 2021-05-03 | Stop reason: HOSPADM

## 2021-05-03 RX ORDER — ACETAMINOPHEN 500 MG/1
1000 TABLET, FILM COATED ORAL 4 TIMES DAILY
Status: SHIPPED | COMMUNITY
Start: 2021-05-03 | End: 2021-11-11

## 2021-05-03 RX ORDER — PROPOFOL 10 MG/ML
INJECTION, EMULSION INTRAVENOUS
Status: DISCONTINUED | OUTPATIENT
Start: 2021-05-03 | End: 2021-05-03 | Stop reason: HOSPADM

## 2021-05-03 RX ORDER — MIDAZOLAM HYDROCHLORIDE 1 MG/ML
1 INJECTION, SOLUTION INTRAMUSCULAR; INTRAVENOUS AS NEEDED
Status: DISCONTINUED | OUTPATIENT
Start: 2021-05-03 | End: 2021-05-03 | Stop reason: HOSPADM

## 2021-05-03 RX ORDER — PROPOFOL 10 MG/ML
INJECTION, EMULSION INTRAVENOUS AS NEEDED
Status: DISCONTINUED | OUTPATIENT
Start: 2021-05-03 | End: 2021-05-03 | Stop reason: HOSPADM

## 2021-05-03 RX ORDER — MORPHINE SULFATE 2 MG/ML
2 INJECTION, SOLUTION INTRAMUSCULAR; INTRAVENOUS
Status: DISCONTINUED | OUTPATIENT
Start: 2021-05-03 | End: 2021-05-03 | Stop reason: HOSPADM

## 2021-05-03 RX ORDER — ONDANSETRON 2 MG/ML
INJECTION INTRAMUSCULAR; INTRAVENOUS AS NEEDED
Status: DISCONTINUED | OUTPATIENT
Start: 2021-05-03 | End: 2021-05-03 | Stop reason: HOSPADM

## 2021-05-03 RX ORDER — IBUPROFEN 600 MG/1
600 TABLET ORAL
Qty: 25 TAB | Refills: 1 | Status: SHIPPED | OUTPATIENT
Start: 2021-05-03 | End: 2021-05-08

## 2021-05-03 RX ORDER — FENTANYL CITRATE 50 UG/ML
INJECTION, SOLUTION INTRAMUSCULAR; INTRAVENOUS AS NEEDED
Status: DISCONTINUED | OUTPATIENT
Start: 2021-05-03 | End: 2021-05-03 | Stop reason: HOSPADM

## 2021-05-03 RX ORDER — DEXAMETHASONE SODIUM PHOSPHATE 4 MG/ML
INJECTION, SOLUTION INTRA-ARTICULAR; INTRALESIONAL; INTRAMUSCULAR; INTRAVENOUS; SOFT TISSUE AS NEEDED
Status: DISCONTINUED | OUTPATIENT
Start: 2021-05-03 | End: 2021-05-03 | Stop reason: HOSPADM

## 2021-05-03 RX ORDER — OXYCODONE HYDROCHLORIDE 5 MG/1
5 TABLET ORAL
Qty: 15 TAB | Refills: 0 | Status: SHIPPED | OUTPATIENT
Start: 2021-05-03 | End: 2021-05-10

## 2021-05-03 RX ORDER — KETOROLAC TROMETHAMINE 30 MG/ML
INJECTION, SOLUTION INTRAMUSCULAR; INTRAVENOUS AS NEEDED
Status: DISCONTINUED | OUTPATIENT
Start: 2021-05-03 | End: 2021-05-03 | Stop reason: HOSPADM

## 2021-05-03 RX ORDER — MIDAZOLAM HYDROCHLORIDE 1 MG/ML
0.5 INJECTION, SOLUTION INTRAMUSCULAR; INTRAVENOUS
Status: DISCONTINUED | OUTPATIENT
Start: 2021-05-03 | End: 2021-05-03 | Stop reason: HOSPADM

## 2021-05-03 RX ORDER — ONDANSETRON 4 MG/1
4 TABLET, ORALLY DISINTEGRATING ORAL
Qty: 8 TAB | Refills: 0 | OUTPATIENT
Start: 2021-05-03 | End: 2021-11-03

## 2021-05-03 RX ORDER — SCOLOPAMINE TRANSDERMAL SYSTEM 1 MG/1
1 PATCH, EXTENDED RELEASE TRANSDERMAL
Status: DISCONTINUED | OUTPATIENT
Start: 2021-05-03 | End: 2021-05-03 | Stop reason: HOSPADM

## 2021-05-03 RX ORDER — POLYETHYLENE GLYCOL 3350 17 G/17G
17 POWDER, FOR SOLUTION ORAL DAILY
Status: SHIPPED | COMMUNITY
Start: 2021-05-03 | End: 2021-11-11

## 2021-05-03 RX ORDER — SODIUM CHLORIDE 9 MG/ML
25 INJECTION, SOLUTION INTRAVENOUS CONTINUOUS
Status: DISCONTINUED | OUTPATIENT
Start: 2021-05-03 | End: 2021-05-03 | Stop reason: HOSPADM

## 2021-05-03 RX ORDER — HYDROMORPHONE HYDROCHLORIDE 2 MG/ML
INJECTION, SOLUTION INTRAMUSCULAR; INTRAVENOUS; SUBCUTANEOUS AS NEEDED
Status: DISCONTINUED | OUTPATIENT
Start: 2021-05-03 | End: 2021-05-03 | Stop reason: HOSPADM

## 2021-05-03 RX ORDER — FENTANYL CITRATE 50 UG/ML
50 INJECTION, SOLUTION INTRAMUSCULAR; INTRAVENOUS AS NEEDED
Status: DISCONTINUED | OUTPATIENT
Start: 2021-05-03 | End: 2021-05-03 | Stop reason: HOSPADM

## 2021-05-03 RX ORDER — BUPIVACAINE HYDROCHLORIDE AND EPINEPHRINE 5; 5 MG/ML; UG/ML
INJECTION, SOLUTION PERINEURAL AS NEEDED
Status: DISCONTINUED | OUTPATIENT
Start: 2021-05-03 | End: 2021-05-03 | Stop reason: HOSPADM

## 2021-05-03 RX ORDER — SODIUM CHLORIDE 0.9 % (FLUSH) 0.9 %
5-40 SYRINGE (ML) INJECTION EVERY 8 HOURS
Status: DISCONTINUED | OUTPATIENT
Start: 2021-05-03 | End: 2021-05-03 | Stop reason: HOSPADM

## 2021-05-03 RX ORDER — OXYCODONE HYDROCHLORIDE 5 MG/1
5 TABLET ORAL AS NEEDED
Status: DISCONTINUED | OUTPATIENT
Start: 2021-05-03 | End: 2021-05-03 | Stop reason: HOSPADM

## 2021-05-03 RX ORDER — LIDOCAINE HYDROCHLORIDE 20 MG/ML
INJECTION, SOLUTION EPIDURAL; INFILTRATION; INTRACAUDAL; PERINEURAL AS NEEDED
Status: DISCONTINUED | OUTPATIENT
Start: 2021-05-03 | End: 2021-05-03 | Stop reason: HOSPADM

## 2021-05-03 RX ORDER — SODIUM CHLORIDE, SODIUM LACTATE, POTASSIUM CHLORIDE, CALCIUM CHLORIDE 600; 310; 30; 20 MG/100ML; MG/100ML; MG/100ML; MG/100ML
125 INJECTION, SOLUTION INTRAVENOUS CONTINUOUS
Status: DISCONTINUED | OUTPATIENT
Start: 2021-05-03 | End: 2021-05-03 | Stop reason: HOSPADM

## 2021-05-03 RX ORDER — ROCURONIUM BROMIDE 10 MG/ML
INJECTION, SOLUTION INTRAVENOUS AS NEEDED
Status: DISCONTINUED | OUTPATIENT
Start: 2021-05-03 | End: 2021-05-03 | Stop reason: HOSPADM

## 2021-05-03 RX ORDER — ONDANSETRON 2 MG/ML
4 INJECTION INTRAMUSCULAR; INTRAVENOUS AS NEEDED
Status: DISCONTINUED | OUTPATIENT
Start: 2021-05-03 | End: 2021-05-03 | Stop reason: HOSPADM

## 2021-05-03 RX ORDER — GLYCOPYRROLATE 0.2 MG/ML
INJECTION INTRAMUSCULAR; INTRAVENOUS AS NEEDED
Status: DISCONTINUED | OUTPATIENT
Start: 2021-05-03 | End: 2021-05-03 | Stop reason: HOSPADM

## 2021-05-03 RX ORDER — DIPHENHYDRAMINE HYDROCHLORIDE 50 MG/ML
12.5 INJECTION, SOLUTION INTRAMUSCULAR; INTRAVENOUS AS NEEDED
Status: DISCONTINUED | OUTPATIENT
Start: 2021-05-03 | End: 2021-05-03 | Stop reason: HOSPADM

## 2021-05-03 RX ADMIN — KETOROLAC TROMETHAMINE 30 MG: 30 INJECTION, SOLUTION INTRAMUSCULAR; INTRAVENOUS at 09:30

## 2021-05-03 RX ADMIN — FENTANYL CITRATE 50 MCG: 50 INJECTION INTRAMUSCULAR; INTRAVENOUS at 08:10

## 2021-05-03 RX ADMIN — OXYCODONE 5 MG: 5 TABLET ORAL at 10:53

## 2021-05-03 RX ADMIN — GLYCOPYRROLATE 0.5 MG: 0.2 INJECTION, SOLUTION INTRAMUSCULAR; INTRAVENOUS at 09:38

## 2021-05-03 RX ADMIN — MIDAZOLAM HYDROCHLORIDE 2 MG: 1 INJECTION, SOLUTION INTRAMUSCULAR; INTRAVENOUS at 07:31

## 2021-05-03 RX ADMIN — SODIUM CHLORIDE, POTASSIUM CHLORIDE, SODIUM LACTATE AND CALCIUM CHLORIDE: 600; 310; 30; 20 INJECTION, SOLUTION INTRAVENOUS at 09:03

## 2021-05-03 RX ADMIN — FENTANYL CITRATE 50 MCG: 50 INJECTION INTRAMUSCULAR; INTRAVENOUS at 07:38

## 2021-05-03 RX ADMIN — HYDROMORPHONE HYDROCHLORIDE 0.4 MG: 2 INJECTION, SOLUTION INTRAMUSCULAR; INTRAVENOUS; SUBCUTANEOUS at 09:46

## 2021-05-03 RX ADMIN — ONDANSETRON HYDROCHLORIDE 4 MG: 2 INJECTION, SOLUTION INTRAMUSCULAR; INTRAVENOUS at 07:31

## 2021-05-03 RX ADMIN — ROCURONIUM BROMIDE 30 MG: 10 INJECTION INTRAVENOUS at 08:44

## 2021-05-03 RX ADMIN — PROPOFOL 130 MG: 10 INJECTION, EMULSION INTRAVENOUS at 07:38

## 2021-05-03 RX ADMIN — FENTANYL CITRATE 50 MCG: 50 INJECTION INTRAMUSCULAR; INTRAVENOUS at 08:44

## 2021-05-03 RX ADMIN — ROCURONIUM BROMIDE 30 MG: 10 INJECTION INTRAVENOUS at 08:12

## 2021-05-03 RX ADMIN — NEOSTIGMINE METHYLSULFATE 3 MG: 1 INJECTION, SOLUTION INTRAVENOUS at 09:38

## 2021-05-03 RX ADMIN — PROPOFOL 70 MG: 10 INJECTION, EMULSION INTRAVENOUS at 08:12

## 2021-05-03 RX ADMIN — PROPOFOL 100 MCG/KG/MIN: 10 INJECTION, EMULSION INTRAVENOUS at 07:44

## 2021-05-03 RX ADMIN — ROCURONIUM BROMIDE 40 MG: 10 INJECTION INTRAVENOUS at 07:39

## 2021-05-03 RX ADMIN — Medication 3 AMPULE: at 06:42

## 2021-05-03 RX ADMIN — WATER 2 G: 1 INJECTION INTRAMUSCULAR; INTRAVENOUS; SUBCUTANEOUS at 07:50

## 2021-05-03 RX ADMIN — SCOPALAMINE 1 PATCH: 1 PATCH, EXTENDED RELEASE TRANSDERMAL at 07:36

## 2021-05-03 RX ADMIN — HYDROMORPHONE HYDROCHLORIDE 0.4 MG: 2 INJECTION, SOLUTION INTRAMUSCULAR; INTRAVENOUS; SUBCUTANEOUS at 09:02

## 2021-05-03 RX ADMIN — SODIUM CHLORIDE, POTASSIUM CHLORIDE, SODIUM LACTATE AND CALCIUM CHLORIDE 25 ML/HR: 600; 310; 30; 20 INJECTION, SOLUTION INTRAVENOUS at 06:45

## 2021-05-03 RX ADMIN — DEXAMETHASONE SODIUM PHOSPHATE 8 MG: 4 INJECTION, SOLUTION INTRAMUSCULAR; INTRAVENOUS at 07:51

## 2021-05-03 RX ADMIN — LIDOCAINE HYDROCHLORIDE 80 MG: 20 INJECTION, SOLUTION EPIDURAL; INFILTRATION; INTRACAUDAL; PERINEURAL at 07:38

## 2021-05-03 RX ADMIN — FENTANYL CITRATE 50 MCG: 50 INJECTION INTRAMUSCULAR; INTRAVENOUS at 08:31

## 2021-05-03 RX ADMIN — FENTANYL CITRATE 50 MCG: 50 INJECTION INTRAMUSCULAR; INTRAVENOUS at 07:52

## 2021-05-03 NOTE — H&P
Pre-Op History and Physical    Subjective:     Beck Reyna is a 38 y.o. female who is here for Procedure(s):  ROBOTIC ASSISTED LAPAROSCOPIC VENTRAL HERNIA REPAIR WITH MESH WITH SEPARATION OF COMPONENTS    Past Medical History:   Diagnosis Date    Nausea & vomiting     Raynaud's disease     Rhesus isoimmunization affecting pregnancy     A negative      Past Surgical History:   Procedure Laterality Date    HX BREAST AUGMENTATION Bilateral 2010    HX  SECTION  ,     HX HEENT      wisdom teeth extraction     Family History   Problem Relation Age of Onset    Diabetes Mother     Hypertension Mother     Hypertension Father     Clotting Disorder Father         blood clot in leg after surgery    Thyroid Disease Father     Arthritis-osteo Father     Psychiatric Disorder Sister     Other Maternal Grandfather         abdominal anuerysm    Diabetes Maternal Grandfather         prostate    Diabetes Maternal Grandmother     Diabetes Paternal Grandfather         prostate      Social History     Tobacco Use    Smoking status: Never Smoker    Smokeless tobacco: Never Used   Substance Use Topics    Alcohol use: No     Frequency: Never       Prior to Admission medications    Medication Sig Start Date End Date Taking? Authorizing Provider   multivitamin (ONE A DAY) tablet Take 1 Tab by mouth daily. Yes Provider, Historical   cetirizine (ZYRTEC) 10 mg tablet Take 10 mg by mouth. Yes Provider, Historical   ibuprofen (MOTRIN) 800 mg tablet Take 1 Tab by mouth every eight (8) hours as needed for Pain. 3/9/19  Yes Teresa Castillo MD     No Known Allergies     Review of Systems:  A comprehensive review of systems was negative except for that written in the History of Present Illness. Objective: Intake and Output:    No intake/output data recorded. No intake/output data recorded.     Physical Exam:   Lungs: clear to auscultation bilaterally  Heart: regular rate and rhythm  Abdomen: soft, +hernia          Data Review:   Recent Results (from the past 24 hour(s))   HCG URINE, QL. - POC    Collection Time: 05/03/21  6:43 AM   Result Value Ref Range    Pregnancy test,urine (POC) Negative NEG             Assessment:     Active Problems:    * No active hospital problems. *      Plan:       I had an extensive discussion with her and her  regarding the risks, benefits, and alternatives of proceeding with a(n) Laparoscopic Ventral Hernia Repair with Mesh, with separation components, robot-assisted. Risks,benefits, and alternatives were discussed including the risk of anesthesia, bleeding, infection, injury to bowel, chronic pain, and recurrence were discussed. The patient was counseled at length about the risks of kathi Covid-19 during their perioperative period and any recovery window from their procedure. She was made aware that kathi Covid-19  may worsen their prognosis for recovering from their procedure and lend to a higher morbidity and/or mortality risk. All material risks, benefits, and reasonable alternatives including postponing the procedure were discussed.  She wishes to proceed with the procedure at this time.     She is in agreement to proceed.     All questions were answered.                Signed By: Elizabeth Hickman MD     May 3, 2021

## 2021-05-03 NOTE — OP NOTES
OPERATIVE NOTE  5/3/2021    Preperative Diagnosis: VENTRAL HERNIA     Post-operative Diagnosis: VENTRAL HERNIA    Surgeon: Tika Carrero MD FACS    Procedures:    1. Right retrorectus release (myocutaneous flap of the trunk)  2. Left retrorectus release (myocutaneous flap of the trunk)  3. Laparoscopic ventral hernia repair with placement of mesh. 4. Robot assisted. Anesthesia: Gen + Local  Estimated Blood Loss: Minimal  Specimens: * No specimens in log *     Assistant: Circ-1: Vijay Zambrano  Scrub Tech-1: Martínez Villa  Surg Asst-1: Shaina Fletcher RN    Complications: None    Pathology: * No specimens in log *      Implants:   Implant Name Type Inv. Item Serial No.  Lot No. LRB No. Used Action   MESH JOSE MANUEL [id number] RECT [employer] POLYPR - [id number]  MESH JOSE MANUEL [id number] RECT [employer]OMATERIAL COMP POLYPR [id number] [employer]_WD NA N/A 1 Implanted       FINDINGS: The patient is noted to have a 3 CM defect along the midline with an associated 6 CM diastasis. The fascia was able to be approximated only after recreation of the anterior abdominal wall with a myocutaneous advancement flap of the right and left rectus muscle. There was some  Remaining abdominal cavity appeared normal.     INDICATION: The patient's w/o is constant with a symptomatic hernia. After  discussing risks, benefits and alteratives, she has agreed to undergo  surgery as above. Consent form was placed on the chart      DESCRIPTION OF PROCEDURE: After satisfactory induction of general  anesthesia, the patient was kept in the supine position in a slight flexed position with appropriate padding. The patient's abdomen was prepped and draped sterilely. After using local anesthetic, an 8 mm optical entry trocar was advanced through the anterior rectus sheath in the left upper quadrant and pneumoperitoneum was gently applied as the retrorectus space was carefully defined using the trocar. Once sufficient mobility was achieved, 3 additional 8 mm ports were placed on the left side after the original entry port was replaced with the 12 mm Airseal port. The robot was docked, robotic instruments inserted. The retrorectus release was completed on the left side up to the midline hernias and diastasis. The medial posterior rectus sheath was divided, entering the preperitoneal space at the midline, and this was carried out across the midline to the opposite side. Retrorectus release on the right side was initiated superiorly and carried inferiorly. The largest hernia was entered. ]It was carefully reduced, taking care to avoid any injury to any incarcerated intraperitoneal structures. There were some adhesion in the pelvis that were taken down as well. The defect left by this hernia was ultimately closed with a 3-0 Stratafix PDS suture. Once release was accomplished on the right side, hemostasis was noted and the posterior fascia was noted to be completely intact. I used Stratafix Symmetric to close the anterior fascia, plicate the diastasis, and incorporate repair of the hernia. Once this was accomplished, the repair appeared quite solid. The mesh was inserted. I used a 20 x 30 cm piece of mesh that was cut and then sewn back together with prolene creating a 15 x 36 cm piece of mesh. The mesh was inserted and secured at the apexes with 2-0 PDS. It was noted to be lying quite nicely in the retrorectus space. The pneumo was slowly evacuated, confirming the mesh to be lying flat without any folding. The ports were removed sequentially under visualization and the remaining pneumo was allowed to escape. The skin incisions were closed with subcuticular Monocryl and Dermabond. The patient remained stable during my presence in the operating room.       Gwyn Hill MD

## 2021-05-03 NOTE — PERIOP NOTES
Handoff Report from Operating Room to PACU    Report received from Hermann White RN and ERNESTO Parrish regarding Harshad Ramachandran. Surgeon(s):  Marshall Weir MD  And Procedure(s) (LRB):  ROBOTIC ASSISTED LAPAROSCOPIC VENTRAL HERNIA REPAIR WITH MESH WITH SEPARATION OF COMPONENTS (N/A)  confirmed   with allergies and dressings discussed. Anesthesia type, drugs, patient history, complications, estimated blood loss, vital signs, intake and output, and last pain medication, lines and temperature were reviewed.

## 2021-05-03 NOTE — PERIOP NOTES
Patient Covid 19 test done 4/29/2021 and result negative. Patient stated self quarantine and reports no signs or symptoms.

## 2021-05-03 NOTE — ANESTHESIA POSTPROCEDURE EVALUATION
Post-Anesthesia Evaluation and Assessment    Patient: Eron Hyman MRN: [id number]  SSN: [id number]    YOB: 1985  Age: 38 y.o. Sex: female       Cardiovascular Function/Vital Signs  Visit Vitals  /60 (BP 1 Location: Right arm, BP Patient Position: At rest)   Pulse 60   Temp 36.8 °C (98.3 °F)   Resp 10   Ht 5' 6\" (1.676 m)   Wt 51.3 kg (113 lb 1.5 oz)   SpO2 97%   BMI 18.25 kg/m²       Patient is status post General anesthesia for Procedure(s):  ROBOTIC ASSISTED LAPAROSCOPIC VENTRAL HERNIA REPAIR WITH MESH WITH SEPARATION OF COMPONENTS. Nausea/Vomiting: None    Postoperative hydration reviewed and adequate. Pain:  Pain Scale 1: Numeric (0 - 10) (05/03/21 1030)  Pain Intensity 1: 0 (05/03/21 1030)   Managed    Neurological Status:   Neuro (WDL): Exceptions to WDL (05/03/21 1041)  Neuro  Neurologic State: Drowsy; Eyes open to voice (05/03/21 0955)  Orientation Level: Oriented X4 (05/03/21 0955)  Cognition: Follows commands (05/03/21 0955)  Speech: Clear (05/03/21 0955)  LUE Motor Response: Purposeful (05/03/21 0955)  LLE Motor Response: Purposeful (05/03/21 0955)  RUE Motor Response: Purposeful (05/03/21 0955)  RLE Motor Response: Purposeful (05/03/21 0955)   At baseline    Mental Status and Level of Consciousness: Alert and oriented to person, place, and time    Pulmonary Status:   O2 Device: None (Room air) (05/03/21 1030)   Adequate oxygenation and airway patent    Complications related to anesthesia: None    Post-anesthesia assessment completed.  No concerns    Signed By: Serg Ashraf MD     May 3, 2021              Procedure(s):  ROBOTIC ASSISTED LAPAROSCOPIC VENTRAL HERNIA REPAIR WITH MESH WITH SEPARATION OF COMPONENTS.    general    <BSHSIANPOST>    INITIAL Post-op Vital signs:   Vitals Value Taken Time   /60 05/03/21 1030   Temp 36.8 °C (98.3 °F) 05/03/21 0955   Pulse 58 05/03/21 1035   Resp 8 05/03/21 1035   SpO2 97 % 05/03/21 1035   Vitals shown include unvalidated device data.

## 2021-05-03 NOTE — ANESTHESIA PREPROCEDURE EVALUATION
Anesthetic History   No history of anesthetic complications            Review of Systems / Medical History  Patient summary reviewed, nursing notes reviewed and pertinent labs reviewed    Pulmonary  Within defined limits                 Neuro/Psych   Within defined limits           Cardiovascular  Within defined limits                Exercise tolerance: >4 METS     GI/Hepatic/Renal  Within defined limits              Endo/Other  Within defined limits           Other Findings              Physical Exam    Airway  Mallampati: II  TM Distance: 4 - 6 cm  Neck ROM: normal range of motion   Mouth opening: Normal     Cardiovascular  Regular rate and rhythm,  S1 and S2 normal,  no murmur, click, rub, or gallop             Dental  No notable dental hx       Pulmonary  Breath sounds clear to auscultation               Abdominal  GI exam deferred       Other Findings            Anesthetic Plan    ASA: 2  Anesthesia type: general          Induction: Intravenous  Anesthetic plan and risks discussed with: Patient

## 2021-05-03 NOTE — PROGRESS NOTES
Patient discharged to home. Iv removed. Discharge instructions, scripts, and medication education done with patient and spouse. Patient voided prior to discharge.

## 2021-05-03 NOTE — DISCHARGE INSTRUCTIONS
Dr. Chasidy Vázquez Discharge Instructions after  [facility]ral/Insicional Hernia Repair  for:  Brian Altamirano    MRN: [id number] : 1985    Admitted: 5/3/2021  Discharged: 5/3/2021       What to do at [address]: resume your normal diet    Recommended activity: Lift less that 10 pounds for 6 weeks. Wear Abdominal Binder for support    Follow-up with Dr. Fitz Ho in 2-3 weeks. Call [phone] for an appointment. Hernia Repair: What to Expect at [address] are likely to have pain for the next few days. You may also feel like you have the flu, and you may have a low fever and feel tired and nauseated. This is common. You should feel better after a few days and will probably feel much better in 7 days. For several weeks you may feel twinges or pulling in the hernia repair when you move. You may have some bruising and swelling. This is normal.    This care sheet gives you a general idea about how long it will take for you to recover. But each person recovers at a different pace. Follow the steps below to get better as quickly as possible. How can you care for yourself at home? Activity  Rest when you feel tired. Getting enough sleep will help you recover. You can sleep with your head up by using three or four pillows. You can also try to sleep with your head up in a recliner chair. Try to walk each day. Start by walking a little more than you did the day before. Bit by bit, increase the amount you walk. Walking boosts blood flow and helps prevent pneumonia and constipation. Put ice or a cold pack on the area of your hernia repair for 10 to 15 minutes at a time. Try to do this every 1 to 2 hours for the first 24 hours (when you are awake) or until the swelling goes down. Put a thin cloth between the ice and your skin. Avoid strenuous activities, such as biking, jogging, weight lifting, or aerobic exercise, until your doctor says it is okay.   You are encouraged to cough and deep breath or use your incentive spirometer if you were given one, every 15-30 minutes when awake. This will help prevent respiratory complications and low grade fevers post-operatively. You may want to hug a pillow when coughing and sneezing to add additional support to the surgical area which will decrease pain during these times. Avoid lifting anything that would make you strain. This may include heavy grocery bags, a heavy briefcase or backpack, cat litter or dog food bags, a child, or a vacuum . You may drive when you are no longer taking pain medicine and can quickly move your foot from the gas pedal to the brake. You must also be able to sit comfortably for a long period of time, even if you do not plan to go far. Most people are able to return to work within 1 to 2 weeks after surgery. You may shower 24 to 48 hours after surgery. Pat the cut (incision) dry. Do not take a bath for the first 2 weeks. Diet  You can eat your normal diet. If your stomach is upset, try bland, low-fat foods like plain rice, broiled chicken, toast, and yogurt. Drink plenty of fluids (unless your doctor tells you not to). You may notice that your bowel movements are not regular right after your surgery. This is common. Avoid constipation and straining with bowel movements. You may want to take a fiber supplement every day. If you have not had a bowel movement after a couple of days, ask your doctor about taking a mild laxative. Medicines  Take pain medicines exactly as directed. If the doctor gave you a prescription medicine for pain, take it as prescribed. If you are not taking a prescription pain medicine, take an over-the-counter medicine such as acetaminophen (Tylenol), ibuprofen (Advil, Motrin), or naproxen (Aleve). Read and follow all instructions on the label. Do not take two or more pain medicines at the same time unless the doctor told you to.  Many pain medicines have acetaminophen, which is Tylenol. Too much acetaminophen (Tylenol) can be harmful. If your doctor prescribed antibiotics, take them as directed. Do not stop taking them just because you feel better. You need to take the full course of antibiotics. If you think your pain medicine is making you sick to your stomach: Take your medicine after meals (unless your doctor has told you not to). Ask your doctor for a different pain medicine. Incision care  If you have staples closing the cut, you will need to visit your doctor in 1 to 2 weeks to have them removed. Wash the area daily with warm, soapy water and pat it dry. Follow-up care is a key part of your treatment and safety. Be sure to make and go to all appointments, and call your doctor if you are having problems. Its also a good idea to know your test results and keep a list of the medicines you take. When should you call for help? Call 911 anytime you think you may need emergency care. For example, call if:  You pass out (lose consciousness). You have sudden chest pain and shortness of breath, or you cough up blood. Call your doctor now or seek immediate medical care if:    You have any fevers greater than 102.5  You have drainage from you wound that is not clear or looks infected  You have persistent bleeding  You have problems urinating  You have persistent nausea/vomiting  TO PREVENT AN INFECTION      1. [address] YOUR HANDS     To prevent infection, good handwashing is the most important thing you or your caregiver can do.  Wash your hands with soap and water or use the hand  we gave you before you touch any wounds. 2. SHOWER     Use the antibacterial soap we gave you when you take a shower.  Shower with this soap until your wounds are healed.  To reach all areas of your body, you may need someone to help you.  Dont forget to clean your belly button with every shower.     3.  USE CLEAN SHEETS     Use freshly cleaned sheets on your bed after surgery.  To keep the surgery site clean, do not allow pets to sleep with you while your wound is still healing. 4. STOP SMOKING     Stop smoking, or at least cut back on smoking     Smoking slows your healing. 5.  CONTROL YOUR BLOOD SUGAR     High blood sugars slow wound healing. If you are diabetic, control your blood sugar levels before and after your surgery. How to Care for Your Wound After Its Treated With  DERMABOND* Topical Skin Adhesive  DERMABOND* Topical Skin Adhesive (2-octyl cyanoacrylate) is a sterile, liquid skin adhesive  that holds wound edges together. The film will usually remain in place for 5 to 10 days, then  naturally fall off your skin. The following will answer some of your questions and provide instructions for proper care for your  wound while it is healing:    CHECK WOUND APPEARANCE   Some swelling, redness, and pain are common with all wounds and normally will go away as the  wound heals. If swelling, redness, or pain increases or if the wound feels warm to the touch,  contact a doctor. Also contact a doctor if the wound edges reopen or separate. REPLACE BANDAGES   If your wound is bandaged, keep the bandage dry.  Replace the dressing daily until the adhesive film has fallen off or if the  bandage should become wet, unless otherwise instructed by your  physician.  When changing the dressing, do not place tape directly over the  DERMABOND adhesive film, because removing the tape later may also  remove the film. AVOID TOPICAL MEDICATIONS   Do not apply liquid or ointment medications or any other product to your wound while the  DERMABOND adhesive film is in place. These may loosen the film before your wound is healed. KEEP WOUND DRY AND PROTECTED   You may occasionally and briefly wet your wound in the shower or bath.  Do not soak or scrub  your wound, do not swim, and avoid periods of heavy perspiration until the DERMABOND  adhesive has naturally fallen off. After showering or bathing, gently blot your wound dry with a  soft towel. If a protective dressing is being used, apply a fresh, dry bandage, being sure to keep  the tape off the DERMABOND adhesive film.  Apply a clean, dry bandage over the wound if necessary to protect it.  Protect your wound from injury until the skin has had sufficient time to heal.   Do not scratch, rub, or pick at the DERMABOND adhesive film. This may loosen the film before  your wound is healed.  Protect the wound from prolonged exposure to sunlight or tanning lamps while the film is in  place. If you have any questions or concerns about this product, please consult your doctor. *Trademark ©[employer] 2002DISCHARGE SUMMARY from Nurse    The following personal items are in your possession at time of discharge:    Dental Appliances: None  Visual Aid: None  Home Medications: None  Jewelry: None  Clothing: None (left in in pt. room)  Other Valuables: None             PATIENT INSTRUCTIONS:    After general anesthesia or intravenous sedation, for 24 hours or while taking prescription Narcotics:  Limit your activities  Do not drive and operate hazardous machinery  Do not make important personal or business decisions  Do  not drink alcoholic beverages  If you have not urinated within 8 hours after discharge, please contact your surgeon on call. Report the following to your surgeon:  Excessive pain, swelling, redness or odor of or around the surgical area  Temperature over 100.5  Nausea and vomiting lasting longer than 4 hours or if unable to take medications  Any signs of decreased circulation or nerve impairment to extremity: change in color, persistent  numbness, tingling, coldness or increase pain  Any questions    To prevent infection remember to refer to your handout on handwashing given to you by your nurse. *  Please give a list of your current medications to your Primary Care Provider.     *  Please update this list whenever your medications are discontinued, doses are      changed, or new medications (including over-the-counter products) are added. *  Please carry medication information at all times in case of emergency situations. These are general instructions for a healthy lifestyle:    No smoking/ No tobacco products/ Avoid exposure to second hand smoke    Surgeon General's Warning:  Quitting smoking now greatly reduces serious risk to your health. Obesity, smoking, and sedentary lifestyle greatly increases your risk for illness    A healthy diet, regular physical exercise & weight monitoring are important for maintaining a healthy lifestyle    You may be retaining fluid if you have a history of heart failure or if you experience any of the following symptoms:  Weight gain of 3 pounds or more overnight or 5 pounds in a week, increased swelling in our hands or feet or shortness of breath while lying flat in bed. Please call your doctor as soon as you notice any of these symptoms; do not wait until your next office visit. Recognize signs and symptoms of STROKE:    F-face looks uneven    A-arms unable to move or move unevenly    S-speech slurred or non-existent    T-time-call 911 as soon as signs and symptoms begin-DO NOT go       Back to bed or wait to see if you get better-TIME IS BRAIN. The discharge information has been reviewed with the patient. The patient verbalized understanding. Patient Education      Oxycodone, Rapid Release (ETH-Oxydose, Oxy IR, Roxicodone) - (By mouth)   Why this medicine is used:   Treats moderate to severe pain. This medicine is a narcotic pain reliever. Contact a nurse or doctor right away if you have:   Fast or slow heart beat, shallow breathing, blue lips, skin or fingernails  Anxiety, restlessness, fever, sweating, muscle spasms, twitching, seeing or hearing things that are not there  Extreme weakness, shallow breathing, slow heartbeat  Severe confusion, lightheadedness, dizziness, fainting  Sweating or cold, clammy skin, seizures  Severe constipation, stomach pain, nausea, vomiting     Common side effects:  Mild constipation  Sleepiness, tiredness  © 2017 [facility] Information is for End User's use only and may not be sold, redistributed or otherwise used for commercial purposes.

## 2021-05-04 ENCOUNTER — HOSPITAL ENCOUNTER (EMERGENCY)
Age: 36
Discharge: HOME | End: 2021-05-04
Attending: EMERGENCY MEDICINE | Admitting: EMERGENCY MEDICINE
Payer: COMMERCIAL

## 2021-05-04 ENCOUNTER — TELEPHONE (OUTPATIENT)
Dept: SURGERY | Age: 36
End: 2021-05-04

## 2021-05-04 VITALS
OXYGEN SATURATION: 100 % | WEIGHT: 121.25 LBS | BODY MASS INDEX: 19.49 KG/M2 | HEIGHT: 66 IN | DIASTOLIC BLOOD PRESSURE: 70 MMHG | TEMPERATURE: 98.4 F | RESPIRATION RATE: 16 BRPM | HEART RATE: 79 BPM | SYSTOLIC BLOOD PRESSURE: 134 MMHG

## 2021-05-04 DIAGNOSIS — R33.9 URINARY RETENTION: Primary | ICD-10-CM

## 2021-05-04 LAB
ANION GAP SERPL CALC-SCNC: 4 MMOL/L (ref 5–15)
APPEARANCE UR: CLEAR
BACTERIA URNS QL MICRO: NEGATIVE /HPF
BASOPHILS # BLD: 0 K/UL (ref 0–0.1)
BASOPHILS NFR BLD: 0 % (ref 0–1)
BILIRUB UR QL: NEGATIVE
BUN SERPL-MCNC: 6 MG/DL (ref 6–20)
BUN/CREAT SERPL: 8 (ref 12–20)
CALCIUM SERPL-MCNC: 9.3 MG/DL (ref 8.5–10.1)
CHLORIDE SERPL-SCNC: 108 MMOL/L (ref 97–108)
CO2 SERPL-SCNC: 28 MMOL/L (ref 21–32)
COLOR UR: NORMAL
CREAT SERPL-MCNC: 0.74 MG/DL (ref 0.55–1.02)
DIFFERENTIAL METHOD BLD: NORMAL
EOSINOPHIL # BLD: 0 K/UL (ref 0–0.4)
EOSINOPHIL NFR BLD: 0 % (ref 0–7)
EPITH CASTS URNS QL MICRO: NORMAL /LPF
ERYTHROCYTE [DISTWIDTH] IN BLOOD BY AUTOMATED COUNT: 12.2 % (ref 11.5–14.5)
GLUCOSE SERPL-MCNC: 83 MG/DL (ref 65–100)
GLUCOSE UR STRIP.AUTO-MCNC: NEGATIVE MG/DL
HCT VFR BLD AUTO: 40.4 % (ref 35–47)
HGB BLD-MCNC: 13.1 G/DL (ref 11.5–16)
HGB UR QL STRIP: NEGATIVE
HYALINE CASTS URNS QL MICRO: NORMAL /LPF (ref 0–5)
IMM GRANULOCYTES # BLD AUTO: 0 K/UL (ref 0–0.04)
IMM GRANULOCYTES NFR BLD AUTO: 0 % (ref 0–0.5)
KETONES UR QL STRIP.AUTO: NEGATIVE MG/DL
LEUKOCYTE ESTERASE UR QL STRIP.AUTO: NEGATIVE
LYMPHOCYTES # BLD: 2.2 K/UL (ref 0.8–3.5)
LYMPHOCYTES NFR BLD: 28 % (ref 12–49)
MCH RBC QN AUTO: 29.4 PG (ref 26–34)
MCHC RBC AUTO-ENTMCNC: 32.4 G/DL (ref 30–36.5)
MCV RBC AUTO: 90.8 FL (ref 80–99)
MONOCYTES # BLD: 0.4 K/UL (ref 0–1)
MONOCYTES NFR BLD: 5 % (ref 5–13)
NEUTS SEG # BLD: 5.3 K/UL (ref 1.8–8)
NEUTS SEG NFR BLD: 67 % (ref 32–75)
NITRITE UR QL STRIP.AUTO: NEGATIVE
NRBC # BLD: 0 K/UL (ref 0–0.01)
NRBC BLD-RTO: 0 PER 100 WBC
PH UR STRIP: 6.5 [PH] (ref 5–8)
PLATELET # BLD AUTO: 178 K/UL (ref 150–400)
PMV BLD AUTO: 9.9 FL (ref 8.9–12.9)
POTASSIUM SERPL-SCNC: 3.8 MMOL/L (ref 3.5–5.1)
PROT UR STRIP-MCNC: NEGATIVE MG/DL
RBC # BLD AUTO: 4.45 M/UL (ref 3.8–5.2)
RBC #/AREA URNS HPF: NORMAL /HPF (ref 0–5)
SODIUM SERPL-SCNC: 140 MMOL/L (ref 136–145)
SP GR UR REFRACTOMETRY: 1.01 (ref 1–1.03)
UA: UC IF INDICATED,UAUC: NORMAL
UROBILINOGEN UR QL STRIP.AUTO: 0.2 EU/DL (ref 0.2–1)
WBC # BLD AUTO: 8.1 K/UL (ref 3.6–11)
WBC URNS QL MICRO: NORMAL /HPF (ref 0–4)

## 2021-05-04 PROCEDURE — 80048 BASIC METABOLIC PNL TOTAL CA: CPT

## 2021-05-04 PROCEDURE — 99284 EMERGENCY DEPT VISIT MOD MDM: CPT

## 2021-05-04 PROCEDURE — 85025 COMPLETE CBC W/AUTO DIFF WBC: CPT

## 2021-05-04 PROCEDURE — 36415 COLL VENOUS BLD VENIPUNCTURE: CPT

## 2021-05-04 PROCEDURE — 51702 INSERT TEMP BLADDER CATH: CPT

## 2021-05-04 PROCEDURE — 74011250637 HC RX REV CODE- 250/637: Performed by: EMERGENCY MEDICINE

## 2021-05-04 PROCEDURE — 81001 URINALYSIS AUTO W/SCOPE: CPT

## 2021-05-04 RX ORDER — MORPHINE SULFATE 2 MG/ML
4 INJECTION, SOLUTION INTRAMUSCULAR; INTRAVENOUS
Status: DISCONTINUED | OUTPATIENT
Start: 2021-05-04 | End: 2021-05-04

## 2021-05-04 RX ORDER — OXYCODONE HYDROCHLORIDE 5 MG/1
5 TABLET ORAL
Status: COMPLETED | OUTPATIENT
Start: 2021-05-04 | End: 2021-05-04

## 2021-05-04 RX ORDER — NITROGLYCERIN 0.4 MG/1
0.4 TABLET SUBLINGUAL
Status: DISCONTINUED | OUTPATIENT
Start: 2021-05-04 | End: 2021-05-04

## 2021-05-04 RX ORDER — GUAIFENESIN 100 MG/5ML
325 LIQUID (ML) ORAL
Status: DISCONTINUED | OUTPATIENT
Start: 2021-05-04 | End: 2021-05-04

## 2021-05-04 RX ORDER — ONDANSETRON 2 MG/ML
4 INJECTION INTRAMUSCULAR; INTRAVENOUS
Status: DISCONTINUED | OUTPATIENT
Start: 2021-05-04 | End: 2021-05-04 | Stop reason: HOSPADM

## 2021-05-04 RX ADMIN — OXYCODONE 5 MG: 5 TABLET ORAL at 12:41

## 2021-05-04 NOTE — ED PROVIDER NOTES
EMERGENCY DEPARTMENT HISTORY AND PHYSICAL EXAM      Date: 5/4/2021  Patient Name: Ambar Abraham    History of Presenting Illness     Chief Complaint   Patient presents with    Urinary Retention     pt had abdominal hernia surgery yesterday and has not urinated since rt after the surgery       History Provided By: Patient    HPI: Ambar Abraham, 38 y.o. female with a past medical history significant for Nausea vomiting, Raynaud's, medic problems as stated below presents to the ED with cc of moderate severe lower abdominal pressure, inability to urinate over the last 24 hours. Patient had an abdominal wall hernia repair per Dr. Brandan Villaseñor yesterday and was discharged with oxycodone for pain. She noted she could not urinate yesterday and has been worse throughout the night and this morning. On arrival she has severe discomfort in her pelvic region. Her bladder scan showed greater than 999 cc of urine. She denies any other fevers, chills, or any other associated symptoms. No other exacerbating ameliorating factors. There are no other complaints, changes, or physical findings at this time. PCP: None    Current Facility-Administered Medications on File Prior to Encounter   Medication Dose Route Frequency Provider Last Rate Last Admin    [DISCONTINUED] bethanechol (URECHOLINE) tablet 10 mg  10 mg Oral Q1H PRN Cindy Jimenez MD         Current Outpatient Medications on File Prior to Encounter   Medication Sig Dispense Refill    polyethylene glycol (MIRALAX) 17 gram packet Take 1 Packet by mouth daily.  ondansetron (ZOFRAN ODT) 4 mg disintegrating tablet Take 1 Tab by mouth every eight (8) hours as needed for Nausea. 8 Tab 0    oxyCODONE IR (ROXICODONE) 5 mg immediate release tablet Take 1 Tab by mouth every four (4) hours as needed for Pain for up to 7 days. Max Daily Amount: 30 mg. 15 Tab 0    ibuprofen (MOTRIN) 600 mg tablet Take 1 Tab by mouth every six (6) hours as needed for Pain for up to 5 days.  25 Tab 1    acetaminophen (Tylenol Extra Strength) 500 mg tablet Take 2 Tabs by mouth four (4) times daily.  multivitamin (ONE A DAY) tablet Take 1 Tab by mouth daily.  cetirizine (ZYRTEC) 10 mg tablet Take 10 mg by mouth.  [DISCONTINUED] ibuprofen (MOTRIN) 800 mg tablet Take 1 Tab by mouth every eight (8) hours as needed for Pain. 30 Tab 0       Past History     Past Medical History:  Past Medical History:   Diagnosis Date    Nausea & vomiting     Raynaud's disease     Rhesus isoimmunization affecting pregnancy     A negative       Past Surgical History:  Past Surgical History:   Procedure Laterality Date    HX BREAST AUGMENTATION Bilateral     HX  SECTION  2013    HX HEENT      wisdom teeth extraction    HX HERNIA REPAIR         Family History:  Family History   Problem Relation Age of Onset    Diabetes Mother     Hypertension Mother     Hypertension Father     Clotting Disorder Father         blood clot in leg after surgery    Thyroid Disease Father     Arthritis-osteo Father     Psychiatric Disorder Sister     Other Maternal Grandfather         abdominal anuerysm    Diabetes Maternal Grandfather         prostate    Diabetes Maternal Grandmother     Diabetes Paternal Grandfather         prostate       Social History:  Social History     Tobacco Use    Smoking status: Never Smoker    Smokeless tobacco: Never Used   Substance Use Topics    Alcohol use: No     Frequency: Never    Drug use: No       Allergies:  No Known Allergies      Review of Systems   Review of Systems   Constitutional: Negative for chills, diaphoresis, fatigue and fever. HENT: Negative for ear pain and sore throat. Eyes: Negative for pain and redness. Respiratory: Negative for cough and shortness of breath. Cardiovascular: Negative for chest pain and leg swelling. Gastrointestinal: Positive for abdominal distention and abdominal pain. Negative for diarrhea, nausea and vomiting. Endocrine: Negative for cold intolerance and heat intolerance. Genitourinary: Positive for difficulty urinating. Negative for flank pain and hematuria. Musculoskeletal: Negative for back pain and neck stiffness. Skin: Negative for rash and wound. Neurological: Negative for dizziness, syncope and headaches. All other systems reviewed and are negative. Physical Exam   Physical Exam  Vitals signs and nursing note reviewed. Constitutional:       Appearance: She is well-developed. HENT:      Head: Normocephalic and atraumatic. Mouth/Throat:      Pharynx: No oropharyngeal exudate. Eyes:      Conjunctiva/sclera: Conjunctivae normal.      Pupils: Pupils are equal, round, and reactive to light. Neck:      Musculoskeletal: Normal range of motion. Cardiovascular:      Rate and Rhythm: Normal rate and regular rhythm. Heart sounds: No murmur. Pulmonary:      Effort: Pulmonary effort is normal. No respiratory distress. Breath sounds: Normal breath sounds. No wheezing. Abdominal:      General: Bowel sounds are normal. There is no distension. Palpations: Abdomen is soft. Tenderness: There is no abdominal tenderness. Musculoskeletal: Normal range of motion. General: No deformity. Skin:     General: Skin is warm and dry. Findings: No rash. Neurological:      Mental Status: She is alert and oriented to person, place, and time.       Coordination: Coordination normal.   Psychiatric:         Behavior: Behavior normal.         Diagnostic Study Results     Labs -     Recent Results (from the past 24 hour(s))   CBC WITH AUTOMATED DIFF    Collection Time: 05/04/21 12:49 PM   Result Value Ref Range    WBC 8.1 3.6 - 11.0 K/uL    RBC 4.45 3.80 - 5.20 M/uL    HGB 13.1 11.5 - 16.0 g/dL    HCT 40.4 35.0 - 47.0 %    MCV 90.8 80.0 - 99.0 FL    MCH 29.4 26.0 - 34.0 PG    MCHC 32.4 30.0 - 36.5 g/dL    RDW 12.2 11.5 - 14.5 %    PLATELET 442 196 - 617 K/uL    MPV 9.9 8.9 - 12.9 FL    NRBC 0.0 0  WBC    ABSOLUTE NRBC 0.00 0.00 - 0.01 K/uL    NEUTROPHILS 67 32 - 75 %    LYMPHOCYTES 28 12 - 49 %    MONOCYTES 5 5 - 13 %    EOSINOPHILS 0 0 - 7 %    BASOPHILS 0 0 - 1 %    IMMATURE GRANULOCYTES 0 0.0 - 0.5 %    ABS. NEUTROPHILS 5.3 1.8 - 8.0 K/UL    ABS. LYMPHOCYTES 2.2 0.8 - 3.5 K/UL    ABS. MONOCYTES 0.4 0.0 - 1.0 K/UL    ABS. EOSINOPHILS 0.0 0.0 - 0.4 K/UL    ABS. BASOPHILS 0.0 0.0 - 0.1 K/UL    ABS. IMM. GRANS. 0.0 0.00 - 0.04 K/UL    DF AUTOMATED     METABOLIC PANEL, BASIC    Collection Time: 05/04/21 12:49 PM   Result Value Ref Range    Sodium 140 136 - 145 mmol/L    Potassium 3.8 3.5 - 5.1 mmol/L    Chloride 108 97 - 108 mmol/L    CO2 28 21 - 32 mmol/L    Anion gap 4 (L) 5 - 15 mmol/L    Glucose 83 65 - 100 mg/dL    BUN 6 6 - 20 MG/DL    Creatinine 0.74 0.55 - 1.02 MG/DL    BUN/Creatinine ratio 8 (L) 12 - 20      GFR est AA >60 >60 ml/min/1.73m2    GFR est non-AA >60 >60 ml/min/1.73m2    Calcium 9.3 8.5 - 10.1 MG/DL   URINALYSIS W/ REFLEX CULTURE    Collection Time: 05/04/21 12:49 PM    Specimen: Urine   Result Value Ref Range    Color YELLOW/STRAW      Appearance CLEAR CLEAR      Specific gravity 1.007 1.003 - 1.030      pH (UA) 6.5 5.0 - 8.0      Protein Negative NEG mg/dL    Glucose Negative NEG mg/dL    Ketone Negative NEG mg/dL    Bilirubin Negative NEG      Blood Negative NEG      Urobilinogen 0.2 0.2 - 1.0 EU/dL    Nitrites Negative NEG      Leukocyte Esterase Negative NEG      WBC 0-4 0 - 4 /hpf    RBC 0-5 0 - 5 /hpf    Epithelial cells FEW FEW /lpf    Bacteria Negative NEG /hpf    UA:UC IF INDICATED CULTURE NOT INDICATED BY UA RESULT CNI      Hyaline cast 0-2 0 - 5 /lpf       Radiologic Studies -   No orders to display     CT Results  (Last 48 hours)    None        CXR Results  (Last 48 hours)    None            Medical Decision Making   I am the first provider for this patient.     I reviewed the vital signs, available nursing notes, past medical history, past surgical history, family history and social history. Vital Signs-Reviewed the patient's vital signs. Patient Vitals for the past 12 hrs:   Temp Pulse Resp BP SpO2   05/04/21 1105 98.4 °F (36.9 °C) 79 16 134/70 100 %       Records Reviewed: Nursing records and medical records reviewed    MDM:  Urinary retention versus side effect of medication versus side effect of anesthesia    Provider Notes (Medical Decision Making):   Patient is a 42-year-old female presenting with urinary tension status post hernia repair. Discussed case with patient surgeon, Dr. Teresa Barakat. Based on my physical examination and complete resolution of her symptoms after Martinez catheter placement, I feel she is safe for outpatient discharge while leaving Martinez catheter in place till seen by urology. This is likely related to the lysis of adhesions that had recurrent surgery which were adhered to her bladder. She will follow up with any signs of infection and her pain is well controlled with the ER. ED Course:   Initial assessment performed. The patients presenting problems have been discussed, and they are in agreement with the care plan formulated and outlined with them. I have encouraged them to ask questions as they arise throughout their visit. Medications Administered     oxyCODONE IR (ROXICODONE) tablet 5 mg     Admin Date  05/04/2021 Action  Given Dose  5 mg Route  Oral Administered By  Xochilt García                    Critical Care:  None      Disposition:  8:26 AM  Misty Izaguirre  results have been reviewed with her. She has been counseled regarding her diagnosis. She verbally conveys understanding and agreement of the signs, symptoms, diagnosis, treatment and prognosis and additionally agrees to follow up as recommended with Dr. None in 24 - 48 hours. She also agrees with the care-plan and conveys that all of her questions have been answered.   I have also put together some discharge instructions for her that include: 1) educational information regarding their diagnosis, 2) how to care for their diagnosis at home, as well a 3) list of reasons why they would want to return to the ED prior to their follow-up appointment, should their condition change. DISCHARGE PLAN:  1. Current Discharge Medication List        2. Follow-up Information     Follow up With Specialties Details Why [address] [facility]  In 1 week For a follow-up evaluation to determine timing of azul catheter removal.. [address] [facility] [place] [address] [id number]    hospitals EMERGENCY DEPT Emergency Medicine In 2 days If symptoms worsen [address] [place] [address]  [phone]        3. Return to ED if worse     Diagnosis     Clinical Impression:   1. Urinary retention        Attestations:    Jennifer Singh MD    Please note that this dictation was completed with [employer], the computer voice recognition software. Quite often unanticipated grammatical, syntax, homophones, and other interpretive errors are inadvertently transcribed by the computer software. Please disregard these errors. Please excuse any errors that have escaped final proofreading. Thank you.

## 2021-05-04 NOTE — TELEPHONE ENCOUNTER
Patient had surgery 05/03 and is having difficulty urinating. Patient would like to know if there is anything they can do. Please call.

## 2021-05-04 NOTE — TELEPHONE ENCOUNTER
Spoke to pt and asked pt about any symptoms she was having pt stated that she was having a lot of pelvic pain due to her not being able to empty her bladder stated than when she tries to go to the bathroom just  small trickle of urine comes out and it burns at the end and she has a very uncomfortable feeling of fullness she stated it reminds her of having a uti.

## 2021-05-04 NOTE — ED NOTES
Assumed care of pt from triage. Pt is A&O x 4. Pt reports CC of urinary retention x 1 day. Pt is post op from hernia surgery yesterday and haven't voided since Upon arrival to room pt was bladder scan and was retaining 999+ml. Martinez insertion is place.      [address] at bedside evaluating pt

## 2021-05-04 NOTE — DISCHARGE INSTRUCTIONS
It was a pleasure taking care of you in our Emergency Department today. We know that when you come to [facility], you are entrusting us with your health, comfort, and safety. Our physicians and nurses honor that trust, and truly appreciate the opportunity to care for you and your loved ones. We also value your feedback. If you receive a survey about your Emergency Department experience today, please fill it out. We care about our patients' feedback, and we listen to what you have to say. Thank you!

## 2021-05-20 ENCOUNTER — OFFICE VISIT (OUTPATIENT)
Dept: SURGERY | Age: 36
End: 2021-05-20
Payer: COMMERCIAL

## 2021-05-20 VITALS
HEART RATE: 101 BPM | TEMPERATURE: 97.5 F | WEIGHT: 114.4 LBS | DIASTOLIC BLOOD PRESSURE: 59 MMHG | RESPIRATION RATE: 16 BRPM | BODY MASS INDEX: 18.39 KG/M2 | HEIGHT: 66 IN | OXYGEN SATURATION: 97 % | SYSTOLIC BLOOD PRESSURE: 102 MMHG

## 2021-05-20 DIAGNOSIS — Z09 POSTOPERATIVE EXAMINATION: Primary | ICD-10-CM

## 2021-05-20 PROCEDURE — 99024 POSTOP FOLLOW-UP VISIT: CPT | Performed by: SURGERY

## 2021-05-20 NOTE — PROGRESS NOTES
Identified pt with two pt identifiers(name and ). Reviewed record in preparation for visit and have obtained necessary documentation. All patient medications has been reviewed. Chief Complaint   Patient presents with    Surgical Follow-up     Laparoscopic ventral hernia repair with placement of mesh. 5/3/21        Health Maintenance Due   Topic    Hepatitis C Screening     COVID-19 Vaccine (1)    PAP AKA CERVICAL CYTOLOGY        Vitals:    21 1030   BP: (!) 102/59   Pulse: (!) 101   Resp: 16   Temp: 97.5 °F (36.4 °C)   TempSrc: Temporal   SpO2: 97%   Weight: 51.9 kg (114 lb 6.4 oz)   Height: 5' 6\" (1.676 m)   PainSc:   0 - No pain       4. Have you been to the ER, urgent care clinic since your last visit? Hospitalized since your last visit? No    5. Have you seen or consulted any other health care providers outside of the [address] [place] [address] since your last visit? Include any pap smears or colon screening. No      Patient is accompanied by  I have received verbal consent from Beck Reyna to discuss any/all medical information while they are present in the room.

## 2021-05-20 NOTE — PROGRESS NOTES
Chief Complaint   Patient presents with    Surgical Follow-up     Laparoscopic ventral hernia repair with placement of mesh. 5/3/21    1. Right retrorectus release (myocutaneous flap of the trunk)  2. Left retrorectus release (myocutaneous flap of the trunk)  3. Laparoscopic ventral hernia repair with placement of mesh. 4. Robot assisted.       Tolerating PO  Pain controlled  Taking no pain meds      Physical Exam:   Abdominal exam: soft  non-distended  appropriatly tender. Wound: clean, dry, no drainage    Doing well  Reassured her and her  that over time her abdomen will flatten out.      Continue restricted activity for a total of 4 weeks  Follow-up: debbie Luong MD FACS

## 2021-06-16 NOTE — PROGRESS NOTES
CBC drawn via right antecubital X1 attempt as per MD order. Katy well. Sample to lab. Pt called into office with some further questions after discussion with Dr Jaiden Escalante about US elastrography results and cirrohosis  1  Pt wanted to know is she going to die from this? If so how long does she have? 2  Is there a treatment plan? Is there an option of possible organ transplant? 3  How severe is liver damage?

## 2021-11-03 ENCOUNTER — HOSPITAL ENCOUNTER (EMERGENCY)
Age: 36
Discharge: HOME | End: 2021-11-03
Attending: EMERGENCY MEDICINE
Payer: COMMERCIAL

## 2021-11-03 VITALS
HEART RATE: 65 BPM | OXYGEN SATURATION: 100 % | RESPIRATION RATE: 16 BRPM | DIASTOLIC BLOOD PRESSURE: 66 MMHG | SYSTOLIC BLOOD PRESSURE: 111 MMHG | TEMPERATURE: 98.4 F

## 2021-11-03 DIAGNOSIS — R51.9 NONINTRACTABLE HEADACHE, UNSPECIFIED CHRONICITY PATTERN, UNSPECIFIED HEADACHE TYPE: ICD-10-CM

## 2021-11-03 DIAGNOSIS — R00.2 PALPITATIONS: Primary | ICD-10-CM

## 2021-11-03 DIAGNOSIS — T50.905A ADVERSE EFFECT OF DRUG, INITIAL ENCOUNTER: ICD-10-CM

## 2021-11-03 LAB
ALBUMIN SERPL-MCNC: 4.2 G/DL (ref 3.5–5)
ALBUMIN/GLOB SERPL: 1.4 {RATIO} (ref 1.1–2.2)
ALP SERPL-CCNC: 41 U/L (ref 45–117)
ALT SERPL-CCNC: 23 U/L (ref 12–78)
AMPHET UR QL SCN: NEGATIVE
ANION GAP SERPL CALC-SCNC: 6 MMOL/L (ref 5–15)
APPEARANCE UR: CLEAR
AST SERPL-CCNC: 14 U/L (ref 15–37)
ATRIAL RATE: 70 BPM
BACTERIA URNS QL MICRO: NEGATIVE /HPF
BARBITURATES UR QL SCN: NEGATIVE
BASOPHILS # BLD: 0 K/UL (ref 0–0.1)
BASOPHILS NFR BLD: 1 % (ref 0–1)
BENZODIAZ UR QL: NEGATIVE
BILIRUB SERPL-MCNC: 1.8 MG/DL (ref 0.2–1)
BILIRUB UR QL: NEGATIVE
BUN SERPL-MCNC: 11 MG/DL (ref 6–20)
BUN/CREAT SERPL: 16 (ref 12–20)
CALCIUM SERPL-MCNC: 9.9 MG/DL (ref 8.5–10.1)
CALCULATED P AXIS, ECG09: 81 DEGREES
CALCULATED R AXIS, ECG10: -12 DEGREES
CALCULATED T AXIS, ECG11: 47 DEGREES
CANNABINOIDS UR QL SCN: NEGATIVE
CHLORIDE SERPL-SCNC: 106 MMOL/L (ref 97–108)
CO2 SERPL-SCNC: 22 MMOL/L (ref 21–32)
COCAINE UR QL SCN: NEGATIVE
COLOR UR: ABNORMAL
CREAT SERPL-MCNC: 0.67 MG/DL (ref 0.55–1.02)
DIAGNOSIS, 93000: NORMAL
DIFFERENTIAL METHOD BLD: ABNORMAL
DRUG SCRN COMMENT,DRGCM: NORMAL
EOSINOPHIL # BLD: 0 K/UL (ref 0–0.4)
EOSINOPHIL NFR BLD: 0 % (ref 0–7)
EPITH CASTS URNS QL MICRO: ABNORMAL /LPF
ERYTHROCYTE [DISTWIDTH] IN BLOOD BY AUTOMATED COUNT: 11.9 % (ref 11.5–14.5)
GLOBULIN SER CALC-MCNC: 3 G/DL (ref 2–4)
GLUCOSE SERPL-MCNC: 139 MG/DL (ref 65–100)
GLUCOSE UR STRIP.AUTO-MCNC: NEGATIVE MG/DL
HCG UR QL: NEGATIVE
HCG UR QL: NEGATIVE
HCT VFR BLD AUTO: 38.8 % (ref 35–47)
HGB BLD-MCNC: 13 G/DL (ref 11.5–16)
HGB UR QL STRIP: NEGATIVE
HYALINE CASTS URNS QL MICRO: ABNORMAL /LPF (ref 0–5)
IMM GRANULOCYTES # BLD AUTO: 0 K/UL (ref 0–0.04)
IMM GRANULOCYTES NFR BLD AUTO: 1 % (ref 0–0.5)
KETONES UR QL STRIP.AUTO: 40 MG/DL
LEUKOCYTE ESTERASE UR QL STRIP.AUTO: NEGATIVE
LYMPHOCYTES # BLD: 1.9 K/UL (ref 0.8–3.5)
LYMPHOCYTES NFR BLD: 22 % (ref 12–49)
MCH RBC QN AUTO: 30 PG (ref 26–34)
MCHC RBC AUTO-ENTMCNC: 33.5 G/DL (ref 30–36.5)
MCV RBC AUTO: 89.4 FL (ref 80–99)
METHADONE UR QL: NEGATIVE
MONOCYTES # BLD: 0.3 K/UL (ref 0–1)
MONOCYTES NFR BLD: 4 % (ref 5–13)
NEUTS SEG # BLD: 6.3 K/UL (ref 1.8–8)
NEUTS SEG NFR BLD: 72 % (ref 32–75)
NITRITE UR QL STRIP.AUTO: NEGATIVE
NRBC # BLD: 0 K/UL (ref 0–0.01)
NRBC BLD-RTO: 0 PER 100 WBC
OPIATES UR QL: NEGATIVE
P-R INTERVAL, ECG05: 142 MS
PCP UR QL: NEGATIVE
PH UR STRIP: 5.5 [PH] (ref 5–8)
PLATELET # BLD AUTO: 236 K/UL (ref 150–400)
PMV BLD AUTO: 10.1 FL (ref 8.9–12.9)
POTASSIUM SERPL-SCNC: 4.1 MMOL/L (ref 3.5–5.1)
PROT SERPL-MCNC: 7.2 G/DL (ref 6.4–8.2)
PROT UR STRIP-MCNC: NEGATIVE MG/DL
Q-T INTERVAL, ECG07: 380 MS
QRS DURATION, ECG06: 74 MS
QTC CALCULATION (BEZET), ECG08: 410 MS
RBC # BLD AUTO: 4.34 M/UL (ref 3.8–5.2)
RBC #/AREA URNS HPF: ABNORMAL /HPF (ref 0–5)
SODIUM SERPL-SCNC: 134 MMOL/L (ref 136–145)
SP GR UR REFRACTOMETRY: 1.02 (ref 1–1.03)
UA: UC IF INDICATED,UAUC: ABNORMAL
UROBILINOGEN UR QL STRIP.AUTO: 0.2 EU/DL (ref 0.2–1)
VENTRICULAR RATE, ECG03: 70 BPM
WBC # BLD AUTO: 8.5 K/UL (ref 3.6–11)
WBC URNS QL MICRO: ABNORMAL /HPF (ref 0–4)

## 2021-11-03 PROCEDURE — 96375 TX/PRO/DX INJ NEW DRUG ADDON: CPT

## 2021-11-03 PROCEDURE — 93005 ELECTROCARDIOGRAM TRACING: CPT

## 2021-11-03 PROCEDURE — 96374 THER/PROPH/DIAG INJ IV PUSH: CPT

## 2021-11-03 PROCEDURE — 36415 COLL VENOUS BLD VENIPUNCTURE: CPT

## 2021-11-03 PROCEDURE — 81001 URINALYSIS AUTO W/SCOPE: CPT

## 2021-11-03 PROCEDURE — 81025 URINE PREGNANCY TEST: CPT

## 2021-11-03 PROCEDURE — 80053 COMPREHEN METABOLIC PANEL: CPT

## 2021-11-03 PROCEDURE — 74011250636 HC RX REV CODE- 250/636: Performed by: EMERGENCY MEDICINE

## 2021-11-03 PROCEDURE — 99285 EMERGENCY DEPT VISIT HI MDM: CPT

## 2021-11-03 PROCEDURE — 80307 DRUG TEST PRSMV CHEM ANLYZR: CPT

## 2021-11-03 PROCEDURE — 85025 COMPLETE CBC W/AUTO DIFF WBC: CPT

## 2021-11-03 RX ORDER — DIPHENHYDRAMINE HYDROCHLORIDE 50 MG/ML
12.5 INJECTION, SOLUTION INTRAMUSCULAR; INTRAVENOUS
Status: COMPLETED | OUTPATIENT
Start: 2021-11-03 | End: 2021-11-03

## 2021-11-03 RX ORDER — PROCHLORPERAZINE EDISYLATE 5 MG/ML
5 INJECTION INTRAMUSCULAR; INTRAVENOUS
Status: COMPLETED | OUTPATIENT
Start: 2021-11-03 | End: 2021-11-03

## 2021-11-03 RX ORDER — BUTALBITAL, ACETAMINOPHEN AND CAFFEINE 300; 40; 50 MG/1; MG/1; MG/1
1 CAPSULE ORAL
Qty: 12 CAPSULE | Refills: 0 | Status: SHIPPED
Start: 2021-11-03 | End: 2022-01-28

## 2021-11-03 RX ORDER — KETOROLAC TROMETHAMINE 30 MG/ML
15 INJECTION, SOLUTION INTRAMUSCULAR; INTRAVENOUS
Status: COMPLETED | OUTPATIENT
Start: 2021-11-03 | End: 2021-11-03

## 2021-11-03 RX ORDER — ONDANSETRON 4 MG/1
4 TABLET, ORALLY DISINTEGRATING ORAL
Qty: 10 TABLET | Refills: 0 | Status: SHIPPED | OUTPATIENT
Start: 2021-11-03 | End: 2022-02-25

## 2021-11-03 RX ADMIN — KETOROLAC TROMETHAMINE 15 MG: 30 INJECTION, SOLUTION INTRAMUSCULAR at 07:13

## 2021-11-03 RX ADMIN — SODIUM CHLORIDE 1000 ML: 9 INJECTION, SOLUTION INTRAVENOUS at 07:15

## 2021-11-03 RX ADMIN — PROCHLORPERAZINE EDISYLATE 5 MG: 5 INJECTION INTRAMUSCULAR; INTRAVENOUS at 07:11

## 2021-11-03 RX ADMIN — DIPHENHYDRAMINE HYDROCHLORIDE 12.5 MG: 50 INJECTION, SOLUTION INTRAMUSCULAR; INTRAVENOUS at 07:10

## 2021-11-03 NOTE — Clinical Note
[address]  [facility] EMERGENCY DEPT  [address] [facility] [address]  [phone]    Work/School Note    Date: 11/3/2021    To Whom It May concern:    Ger Alcantara was seen and treated today in the emergency room by the following provider(s):  Attending Provider: Nereyda Pat MD.      Ger Alcantara is excused from work/school on 11/03/21 and 11/04/21. She is medically clear to return to work/school on 11/5/2021.        Sincerely,          Pedro Wier MD

## 2021-11-03 NOTE — ED NOTES
Bedside and Verbal shift change report given to Mago Penny RN (oncoming nurse) by Briana Kumar RN (offgoing nurse). Report included the following information SBAR, Kardex, ED Summary, MAR, Recent Results and Med Rec Status.

## 2021-11-04 NOTE — ED PROVIDER NOTES
EMERGENCY DEPARTMENT HISTORY AND PHYSICAL EXAM      Date: 11/3/2021  Patient Name: Mckayla Freeman    History of Presenting Illness     Chief Complaint   Patient presents with    Headache     C/o headache after second dose of Lexapro       History Provided By: Patient    HPI: Mckayla Freeman, 39 y.o. female with PMHx significant for anxiety and migraines presents to the ED with headache and nausea. Patient reports that her OB/GYN prescribed Lexapro to help with anxiety. She took it for the last 2 nights. Developed a headache the morning after her first dose, but the headache resolved and then she took it again last night. Developed a severe headache at about 10 PM severe headache and decided to come in for evaluation. She reports nausea but denies any vomiting. She reports tightness in her jaw and pressure in her head. Reports the headache involves her entire head and the back of her neck. Denies any stiffness in her  neck however. Denies vision changes. Denies any fevers or chills. Denies any chest pain or shortness of breath. States that she has been having palpitations and feels anxious. PCP: Myriam Alvarado, NP    No current facility-administered medications on file prior to encounter. Current Outpatient Medications on File Prior to Encounter   Medication Sig Dispense Refill    polyethylene glycol (MIRALAX) 17 gram packet Take 1 Packet by mouth daily. (Patient not taking: Reported on 5/20/2021)      acetaminophen (Tylenol Extra Strength) 500 mg tablet Take 2 Tabs by mouth four (4) times daily. (Patient not taking: Reported on 5/20/2021)      multivitamin (ONE A DAY) tablet Take 1 Tab by mouth daily. (Patient not taking: Reported on 5/20/2021)      cetirizine (ZYRTEC) 10 mg tablet Take 10 mg by mouth.  (Patient not taking: Reported on 5/20/2021)         Past History     Past Medical History:  Past Medical History:   Diagnosis Date    Nausea & vomiting     Raynaud's disease     Rhesus isoimmunization affecting pregnancy     A negative       Past Surgical History:  Past Surgical History:   Procedure Laterality Date    HX BREAST AUGMENTATION Bilateral 2010    HX  SECTION  ,     HX HEENT      wisdom teeth extraction    HX HERNIA REPAIR         Family History:  Family History   Problem Relation Age of Onset    Diabetes Mother     Hypertension Mother     Hypertension Father     Clotting Disorder Father         blood clot in leg after surgery    Thyroid Disease Father     Arthritis-osteo Father     Psychiatric Disorder Sister     Other Maternal Grandfather         abdominal anuerysm    Diabetes Maternal Grandfather         prostate    Diabetes Maternal Grandmother     Diabetes Paternal Grandfather         prostate       Social History:  Social History     Tobacco Use    Smoking status: Never Smoker    Smokeless tobacco: Never Used   Vaping Use    Vaping Use: Never used   Substance Use Topics    Alcohol use: No    Drug use: No       Allergies:  No Known Allergies      Review of Systems   Review of Systems   Constitutional: Negative for chills and fever. HENT: Negative for congestion, ear pain, facial swelling, rhinorrhea and sore throat. Respiratory: Negative for cough, chest tightness, shortness of breath and wheezing. Cardiovascular: Positive for palpitations. Negative for chest pain. Gastrointestinal: Positive for nausea. Negative for abdominal pain, diarrhea and vomiting. Genitourinary: Negative for dysuria, flank pain and hematuria. Musculoskeletal: Positive for neck pain. Negative for back pain. Skin: Negative for rash and wound. Neurological: Positive for headaches. Negative for syncope. Psychiatric/Behavioral: Negative for confusion. The patient is nervous/anxious. All other systems reviewed and are negative.         Physical Exam    General appearance - well nourished, well appearing, and in no distress  Eyes - pupils equal and reactive, extraocular eye movements intact  ENT - mucous membranes moist, pharynx normal without lesions  Neck - supple, no significant adenopathy; mildly tender at the base of the skull bilaterally, no meningismus  Chest - clear to auscultation, no wheezes, rales or rhonchi; non-tender to palpation  Heart - normal rate and regular rhythm, S1 and S2 normal, no murmurs noted  Abdomen - soft, nontender, nondistended, no masses or organomegaly  Musculoskeletal - no joint tenderness, deformity or swelling; normal ROM  Extremities - peripheral pulses normal, no pedal edema  Skin - normal coloration and turgor, no rashes  Neurological - alert, oriented x3, normal speech, no focal findings or movement disorder noted    Diagnostic Study Results     Labs -     Recent Results (from the past 48 hour(s))   HCG URINE, QL. - POC    Collection Time: 11/03/21  3:18 AM   Result Value Ref Range    Pregnancy test,urine (POC) Negative NEG     URINALYSIS W/ REFLEX CULTURE    Collection Time: 11/03/21  3:30 AM    Specimen: Urine   Result Value Ref Range    Color YELLOW/STRAW      Appearance CLEAR CLEAR      Specific gravity 1.018 1.003 - 1.030      pH (UA) 5.5 5.0 - 8.0      Protein Negative NEG mg/dL    Glucose Negative NEG mg/dL    Ketone 40 (A) NEG mg/dL    Bilirubin Negative NEG      Blood Negative NEG      Urobilinogen 0.2 0.2 - 1.0 EU/dL    Nitrites Negative NEG      Leukocyte Esterase Negative NEG      WBC 0-4 0 - 4 /hpf    RBC 0-5 0 - 5 /hpf    Epithelial cells FEW FEW /lpf    Bacteria Negative NEG /hpf    UA:UC IF INDICATED CULTURE NOT INDICATED BY UA RESULT CNI      Hyaline cast 0-2 0 - 5 /lpf   DRUG SCREEN, URINE    Collection Time: 11/03/21  3:30 AM   Result Value Ref Range    AMPHETAMINES Negative NEG      BARBITURATES Negative NEG      BENZODIAZEPINES Negative NEG      COCAINE Negative NEG      METHADONE Negative NEG      OPIATES Negative NEG      PCP(PHENCYCLIDINE) Negative NEG      THC (TH-CANNABINOL) Negative NEG Drug screen comment (NOTE)    CBC WITH AUTOMATED DIFF    Collection Time: 11/03/21  3:30 AM   Result Value Ref Range    WBC 8.5 3.6 - 11.0 K/uL    RBC 4.34 3.80 - 5.20 M/uL    HGB 13.0 11.5 - 16.0 g/dL    HCT 38.8 35.0 - 47.0 %    MCV 89.4 80.0 - 99.0 FL    MCH 30.0 26.0 - 34.0 PG    MCHC 33.5 30.0 - 36.5 g/dL    RDW 11.9 11.5 - 14.5 %    PLATELET 088 543 - 750 K/uL    MPV 10.1 8.9 - 12.9 FL    NRBC 0.0 0  WBC    ABSOLUTE NRBC 0.00 0.00 - 0.01 K/uL    NEUTROPHILS 72 32 - 75 %    LYMPHOCYTES 22 12 - 49 %    MONOCYTES 4 (L) 5 - 13 %    EOSINOPHILS 0 0 - 7 %    BASOPHILS 1 0 - 1 %    IMMATURE GRANULOCYTES 1 (H) 0.0 - 0.5 %    ABS. NEUTROPHILS 6.3 1.8 - 8.0 K/UL    ABS. LYMPHOCYTES 1.9 0.8 - 3.5 K/UL    ABS. MONOCYTES 0.3 0.0 - 1.0 K/UL    ABS. EOSINOPHILS 0.0 0.0 - 0.4 K/UL    ABS. BASOPHILS 0.0 0.0 - 0.1 K/UL    ABS. IMM. GRANS. 0.0 0.00 - 0.04 K/UL    DF AUTOMATED     METABOLIC PANEL, COMPREHENSIVE    Collection Time: 11/03/21  3:30 AM   Result Value Ref Range    Sodium 134 (L) 136 - 145 mmol/L    Potassium 4.1 3.5 - 5.1 mmol/L    Chloride 106 97 - 108 mmol/L    CO2 22 21 - 32 mmol/L    Anion gap 6 5 - 15 mmol/L    Glucose 139 (H) 65 - 100 mg/dL    BUN 11 6 - 20 MG/DL    Creatinine 0.67 0.55 - 1.02 MG/DL    BUN/Creatinine ratio 16 12 - 20      GFR est AA >60 >60 ml/min/1.73m2    GFR est non-AA >60 >60 ml/min/1.73m2    Calcium 9.9 8.5 - 10.1 MG/DL    Bilirubin, total 1.8 (H) 0.2 - 1.0 MG/DL    ALT (SGPT) 23 12 - 78 U/L    AST (SGOT) 14 (L) 15 - 37 U/L    Alk.  phosphatase 41 (L) 45 - 117 U/L    Protein, total 7.2 6.4 - 8.2 g/dL    Albumin 4.2 3.5 - 5.0 g/dL    Globulin 3.0 2.0 - 4.0 g/dL    A-G Ratio 1.4 1.1 - 2.2     EKG, 12 LEAD, INITIAL    Collection Time: 11/03/21  7:16 AM   Result Value Ref Range    Ventricular Rate 70 BPM    Atrial Rate 70 BPM    P-R Interval 142 ms    QRS Duration 74 ms    Q-T Interval 380 ms    QTC Calculation (Bezet) 410 ms    Calculated P Axis 81 degrees    Calculated R Axis -12 degrees    Calculated T Axis 47 degrees    Diagnosis       Sinus rhythm with premature atrial complexes  Left atrial enlargement  No previous ECGs available  Confirmed by Geno Daniel ([id number]) on 11/3/2021 11:41:44 AM     HCG URINE, QL. - POC    Collection Time: 11/03/21  7:22 AM   Result Value Ref Range    Pregnancy test,urine (POC) Negative NEG         Radiologic Studies -   No orders to display     CT Results  (Last 48 hours)    None        CXR Results  (Last 48 hours)    None            Medical Decision Making   I am the first provider for this patient. I reviewed the vital signs, available nursing notes, past medical history, past surgical history, family history and social history. Vital Signs-Reviewed the patient's vital signs. Records Reviewed: Nursing Notes and Old Medical Records    Provider Notes (Medical Decision Making):   Differential diagnosis: Tension, cluster, migraine headache, medication reaction  We will check CBC, CMP, and will treat with Toradol, Compazine, Benadryl and IV fluids    ED Course:   Initial assessment performed. The patients presenting problems have been discussed, and they are in agreement with the care plan formulated and outlined with them. I have encouraged them to ask questions as they arise throughout their visit. Progress Notes:  ED Course as of 11/04/21 1056   Wed Nov 03, 2021   4677 Patient is feeling much better after IV fluids and meds in the ED. Headache is down to 2 out of 10. No ectopy on the monitor and EKG is unremarkable. Will treat with Zofran and Fioricet. [AO]      ED Course User Index  [AO] Nereyda Pat MD       Disposition:  Discharge home    PLAN:  1.    Discharge Medication List as of 11/3/2021  9:16 AM      START taking these medications    Details   butalbital-acetaminophen-caff (FIORICET) -40 mg per capsule Take 1 Capsule by mouth every six (6) hours as needed for Headache., Normal, Disp-12 Capsule, R-0      ondansetron (Zofran ODT) 4 mg disintegrating tablet Take 1 Tablet by mouth every eight (8) hours as needed for Nausea., Normal, Disp-10 Tablet, R-0         CONTINUE these medications which have NOT CHANGED    Details   polyethylene glycol (MIRALAX) 17 gram packet Take 1 Packet by mouth daily. , OTC      acetaminophen (Tylenol Extra Strength) 500 mg tablet Take 2 Tabs by mouth four (4) times daily. , OTC      multivitamin (ONE A DAY) tablet Take 1 Tab by mouth daily. , Historical Med      cetirizine (ZYRTEC) 10 mg tablet Take 10 mg by mouth., Historical Med           2. Follow-up Information     Follow up With Specialties Details Why Contact Info    Chadwick Oro NP Internal Medicine Schedule an appointment as soon as possible for a visit   Annika  [phone]      [facility] EMERGENCY DEPT Emergency Medicine  If symptoms worsen [address] [place] [address] Drive  [address] [facility]  [phone]        Return to ED if worse     Diagnosis     Clinical Impression:   1. Palpitations    2. Adverse effect of drug, initial encounter    3.  Nonintractable headache, unspecified chronicity pattern, unspecified headache type

## 2021-11-11 ENCOUNTER — OFFICE VISIT (OUTPATIENT)
Dept: INTERNAL MEDICINE CLINIC | Age: 36
End: 2021-11-11
Payer: COMMERCIAL

## 2021-11-11 VITALS
OXYGEN SATURATION: 99 % | DIASTOLIC BLOOD PRESSURE: 72 MMHG | TEMPERATURE: 97.8 F | SYSTOLIC BLOOD PRESSURE: 108 MMHG | RESPIRATION RATE: 16 BRPM | WEIGHT: 112.4 LBS | HEART RATE: 88 BPM | HEIGHT: 66 IN | BODY MASS INDEX: 18.06 KG/M2

## 2021-11-11 DIAGNOSIS — R11.2 NAUSEA AND VOMITING, INTRACTABILITY OF VOMITING NOT SPECIFIED, UNSPECIFIED VOMITING TYPE: ICD-10-CM

## 2021-11-11 DIAGNOSIS — F43.0 PANIC ATTACK AS REACTION TO STRESS: ICD-10-CM

## 2021-11-11 DIAGNOSIS — F41.0 PANIC ATTACK AS REACTION TO STRESS: ICD-10-CM

## 2021-11-11 DIAGNOSIS — G43.109 MIGRAINE WITH AURA AND WITHOUT STATUS MIGRAINOSUS, NOT INTRACTABLE: Primary | ICD-10-CM

## 2021-11-11 DIAGNOSIS — F41.9 ANXIETY: ICD-10-CM

## 2021-11-11 PROCEDURE — 99214 OFFICE O/P EST MOD 30 MIN: CPT | Performed by: NURSE PRACTITIONER

## 2021-11-11 RX ORDER — SUMATRIPTAN 50 MG/1
50 TABLET, FILM COATED ORAL
Qty: 10 TABLET | Refills: 2 | Status: SHIPPED | OUTPATIENT
Start: 2021-11-11 | End: 2021-11-11

## 2021-11-11 RX ORDER — PROMETHAZINE HYDROCHLORIDE 25 MG/1
25 TABLET ORAL
Qty: 30 TABLET | Refills: 2 | Status: SHIPPED | OUTPATIENT
Start: 2021-11-11

## 2021-11-11 RX ORDER — ALPRAZOLAM 0.5 MG/1
.25-.5 TABLET ORAL
Qty: 15 TABLET | Refills: 0 | Status: SHIPPED | OUTPATIENT
Start: 2021-11-11 | End: 2022-04-18 | Stop reason: SDUPTHER

## 2021-11-11 RX ORDER — VENLAFAXINE HYDROCHLORIDE 37.5 MG/1
CAPSULE, EXTENDED RELEASE ORAL
Qty: 42 CAPSULE | Refills: 0 | Status: SHIPPED | OUTPATIENT
Start: 2021-11-11 | End: 2021-12-06 | Stop reason: SDUPTHER

## 2021-11-11 NOTE — PROGRESS NOTES
Chief Complaint   Patient presents with   [facility] Establish Care     Visit Vitals  /72 (BP 1 Location: Left upper arm, BP Patient Position: Sitting, BP Cuff Size: Adult)   Pulse 88   Temp 97.8 °F (36.6 °C) (Oral)   Resp 16   Ht 5' 6\" (1.676 m)   Wt 112 lb 6.4 oz (51 kg)   LMP 11/05/2021 (Exact Date)   SpO2 99%   Breastfeeding No   BMI 18.14 kg/m²     1. Have you been to the ER, urgent care clinic since your last visit? Hospitalized since your last visit? ED [facility] 11/3/21 for headache    2. Have you seen or consulted any other health care providers outside of the [address] [place] [address] since your last visit? Include any pap smears or colon screening.  Saw eye   Gyn-Dr. Annika Hopkins

## 2021-11-11 NOTE — PROGRESS NOTES
Chief Complaint   Patient presents with    Establish Care       SUBJECTIVE:    Brett Urban is a 39 y.o. female who is new to me, and here today to discuss her longstanding history of migraine headaches with aura, severe nausea and occasional vomiting, stress reaction, and anxiety/panic episodes. Patient states she was previously diagnosed with migraine headaches at age 14 at the onset of her menstrual cycles, and has previously been treated with NSAIDs as well as Fioricet. She states she has had migraine headaches off and on since that time. She states she is never taken anything specific for migraines, nor has she used any daily suppressants for migraines. She states she has an appointment with Dr. America Maxwell (neurology) next month. She states she is currently having migraine headaches, often accompanied by aura as well as nausea/vomiting, and occurring approximately 1-2 times a week. She has \"regular headaches\" 2-3 times a week which she states respond well to traditional anti-inflammatories. She identifies her triggers for her migraines is being increased stress, bright lights, or excessive fatigue. She states she often has to lay down in a dark room, motionless, and without noises for it to resolve. She also admits growing up in a stressful household which has contributed to a great deal of anxiety which continues to bother her on a regular basis. Again, she states she has not taken anything on a regular basis for this. She was previously given Lexapro to start taking by her OB/GYN, but had a severe reaction which included panic attack and tachycardia after the first dose. She is open to options of treatment, but would prefer not to use that medication. Previous labs from 11/03/2021 were reviewed prior to seeing patient today.     Current Outpatient Medications   Medication Sig Dispense Refill    butalbital-acetaminophen-caff (FIORICET) -40 mg per capsule Take 1 Capsule by mouth every six (6) hours as needed for Headache. 12 Capsule 0    ondansetron (Zofran ODT) 4 mg disintegrating tablet Take 1 Tablet by mouth every eight (8) hours as needed for Nausea.  10 Tablet 0     Past Medical History:   Diagnosis Date    Nausea & vomiting     Raynaud's disease     Rhesus isoimmunization affecting pregnancy     A negative     Past Surgical History:   Procedure Laterality Date    HX BREAST AUGMENTATION Bilateral     HX  SECTION  ,     HX HEENT      wisdom teeth extraction    HX HERNIA REPAIR  2021     No Known Allergies    REVIEW OF SYSTEMS:                                        POSITIVE= bold text  Negative = regular text    General:                     fever, chills, sweats, generalized weakness, weight loss/gain,                                       loss of appetite   Eyes:                           blurred vision, eye pain, loss of vision, double vision  ENT:                            rhinorrhea, pharyngitis   Respiratory:               cough, sputum production, SOB, ERICKSON, wheezing, pleuritic pain   Cardiology:                chest pain, palpitations, orthopnea, PND, edema, syncope   Gastrointestinal:       abdominal pain , N/V, diarrhea, dysphagia, constipation, bleeding   Genitourinary:           frequency, urgency, dysuria, hematuria, incontinence   Muskuloskeletal :      arthralgia, myalgia, back pain  Hematology:              easy bruising, nose or gum bleeding, lymphadenopathy   Dermatological:         rash, ulceration, pruritis, color change / jaundice  Endocrine:                 hot flashes or polydipsia   Neurological:             headache, dizziness, confusion, focal weakness, paresthesia,                                      Speech difficulties, memory loss, gait difficulty  Psychological:          Feelings of anxiety, depression, agitation        Social History     Socioeconomic History    Marital status:    Tobacco Use    Smoking status: Never Smoker    Smokeless tobacco: Never Used   Vaping Use    Vaping Use: Never used   Substance and Sexual Activity    Alcohol use: Yes     Alcohol/week: 1.0 standard drink     Types: 1 Standard drinks or equivalent per week    Drug use: No    Sexual activity: Yes     Partners: Male     Family History   Problem Relation Age of Onset    Diabetes Mother     Hypertension Mother     Hypertension Father     Clotting Disorder Father         blood clot in leg after surgery    Thyroid Disease Father     Arthritis-osteo Father     Psychiatric Disorder Sister     Other Maternal Grandfather         abdominal anuerysm    Diabetes Maternal Grandfather         prostate    Diabetes Maternal Grandmother     Diabetes Paternal Grandfather         prostate       OBJECTIVE:     Visit Vitals  /72 (BP 1 Location: Left upper arm, BP Patient Position: Sitting, BP Cuff Size: Adult)   Pulse 88   Temp 97.8 °F (36.6 °C) (Oral)   Resp 16   Ht 5' 6\" (1.676 m)   Wt 112 lb 6.4 oz (51 kg)   LMP 11/05/2021 (Exact Date)   SpO2 99%   Breastfeeding No   BMI 18.14 kg/m²       Constitutional: She appears well nourished, of stated age, and dressed appropriately. Eyes: Sclera anicteric, PERRLA, EOMI  Neck: Supple without lymphadenopathy. Thyroid normal, No JVD or bruits  Respiratory: Clear to ascultation X5, normal inspiratory effort, no adventitious breath sounds. Cardiovascular: Regular rate and rhythm, no murmurs, rubs or gallops, PMI not displaced, No thrills, no peripheral edema  Neuro: Non-focal exam, A & O X 3.   Psychiatric: Appropriate affect and demeanor, pleasant and cooperative. Patient's thought content and thought processing appear to be within normal limits. ASSESSMENT/PLAN:       ICD-10-CM ICD-9-CM    1. Migraine with aura and without status migrainosus, not intractable  G43.109 346.00 SUMAtriptan (IMITREX) 50 mg tablet   2.  Nausea and vomiting, intractability of vomiting not specified, unspecified vomiting type  R11.2 787.01 promethazine (PHENERGAN) 25 mg tablet   3. Panic attack as reaction to stress  F41.0 308.0 venlafaxine-SR (EFFEXOR-XR) 37.5 mg capsule    F43.0  ALPRAZolam (XANAX) 0.5 mg tablet   4. Anxiety  F41.9 300.00 venlafaxine-SR (EFFEXOR-XR) 37.5 mg capsule      ALPRAZolam (XANAX) 0.5 mg tablet     1: Patient will be given prescription for sumatriptan 50 mg to take at onset of migraine headaches. May combine with over-the-counter Advil as well.  2: Patient will be given Phenergan tablets to take for any nausea/vomiting.  3: Patient to start venlafaxine XR 37.5 mg to be taken 1 daily for 14 days, then increase to twice daily. 4: Patient will be given a limited supply of Xanax to take for breakthrough panic episodes/anxiety attacks. 5: Patient to follow-up with me in approximately 6 weeks, or sooner as needed. 6: Patient to keep appointment with neurology as planned in December. 7: I have suggested patient seek counseling/therapy due to longstanding history of anxiety and stress issues. Patient agrees. ATTENTION:   This medical record was transcribed using an electronic medical records system. Although proofread, it may and can contain electronic and spelling errors. Other human spelling and other errors may be present. Corrections may be executed at a later time. Please feel free to contact us for any clarifications as needed. Signed,  Natanael Lagunas.  Aura Sandifer, MSN APRN FNP-BC

## 2022-01-05 ENCOUNTER — OFFICE VISIT (OUTPATIENT)
Dept: INTERNAL MEDICINE CLINIC | Age: 37
End: 2022-01-05
Payer: COMMERCIAL

## 2022-01-05 VITALS
HEART RATE: 88 BPM | WEIGHT: 113.2 LBS | SYSTOLIC BLOOD PRESSURE: 122 MMHG | TEMPERATURE: 97.8 F | HEIGHT: 66 IN | DIASTOLIC BLOOD PRESSURE: 70 MMHG | RESPIRATION RATE: 16 BRPM | OXYGEN SATURATION: 100 % | BODY MASS INDEX: 18.19 KG/M2

## 2022-01-05 DIAGNOSIS — R29.818 EXTRAPYRAMIDAL SYMPTOM: ICD-10-CM

## 2022-01-05 DIAGNOSIS — F43.0 PANIC ATTACK AS REACTION TO STRESS: Primary | ICD-10-CM

## 2022-01-05 DIAGNOSIS — G43.109 MIGRAINE WITH AURA AND WITHOUT STATUS MIGRAINOSUS, NOT INTRACTABLE: ICD-10-CM

## 2022-01-05 DIAGNOSIS — F41.9 ANXIETY: ICD-10-CM

## 2022-01-05 DIAGNOSIS — K59.03 DRUG-INDUCED CONSTIPATION: ICD-10-CM

## 2022-01-05 DIAGNOSIS — F41.0 PANIC ATTACK AS REACTION TO STRESS: Primary | ICD-10-CM

## 2022-01-05 DIAGNOSIS — R25.1 TREMOR OF BOTH HANDS: ICD-10-CM

## 2022-01-05 PROCEDURE — 99214 OFFICE O/P EST MOD 30 MIN: CPT | Performed by: NURSE PRACTITIONER

## 2022-01-05 RX ORDER — DULOXETIN HYDROCHLORIDE 30 MG/1
30 CAPSULE, DELAYED RELEASE ORAL DAILY
Qty: 30 CAPSULE | Refills: 2 | Status: SHIPPED
Start: 2022-01-05 | End: 2022-02-24

## 2022-01-05 RX ORDER — IBUPROFEN 200 MG
TABLET ORAL
COMMUNITY

## 2022-01-05 NOTE — PROGRESS NOTES
Camilo Noonan is a 39 y.o. female    Chief Complaint   Patient presents with    Discuss Medications     6 wk follow up       Visit Vitals  /70 (BP 1 Location: Left upper arm, BP Patient Position: Sitting, BP Cuff Size: Small adult)   Pulse 88   Temp 97.8 °F (36.6 °C)   Resp 16   Ht 5' 6\" (1.676 m)   Wt 113 lb 3.2 oz (51.3 kg)   LMP 12/25/2021 (Exact Date)   SpO2 100%   Breastfeeding No   BMI 18.27 kg/m²           1. Have you been to the ER, urgent care clinic since your last visit? Hospitalized since your last visit? NO    2. Have you seen or consulted any other health care providers outside of the [address] [place] [address] since your last visit? Include any pap smears or colon screening.  NO

## 2022-01-05 NOTE — PROGRESS NOTES
Chief Complaint   Patient presents with    Discuss Medications     6 wk follow up       SUBJECTIVE:    Lorenza Akbar is a 39 y.o. female who is here today for a follow up appointment regarding her panic attacks, anxiety, migraine headaches, and possible side effects of current medications. At our last encounter, the patient was started on Effexor XR to manage her panic episodes and anxiety. She states the medication has worked quite well in reducing her anxiety overall, however she has begun to have some unwanted side effects including constipation and hand tremors. She states she used to have bowel movements at least once to twice a day, and now her bowel movements are typically every 3 to 5 days. She has not tried any remedy for this. She is also complaining of tremors to her hands which are noticeable at our encounter today. The patient was also given sumatriptan for management of her migraine headaches. She states she had tried it on several occasions, but found that it only concentrated her headache into a specific area rather than relieve it. She would like to try something different if possible. Current Outpatient Medications   Medication Sig Dispense Refill    ibuprofen (Motrin IB) 200 mg tablet Take  by mouth.  venlafaxine-SR (EFFEXOR-XR) 75 mg capsule Take 1 Capsule by mouth daily. Indications: repeated episodes of anxiety 90 Capsule 0    promethazine (PHENERGAN) 25 mg tablet Take 1 Tablet by mouth every eight (8) hours as needed for Nausea. 30 Tablet 2    ALPRAZolam (XANAX) 0.5 mg tablet Take 0.5-1 Tablets by mouth three (3) times daily as needed for Anxiety. Max Daily Amount: 1.5 mg. Indications: SEVERE ANXIETY 15 Tablet 0    butalbital-acetaminophen-caff (FIORICET) -40 mg per capsule Take 1 Capsule by mouth every six (6) hours as needed for Headache.  (Patient not taking: Reported on 1/5/2022) 12 Capsule 0    ondansetron (Zofran ODT) 4 mg disintegrating tablet Take 1 Tablet by mouth every eight (8) hours as needed for Nausea.  (Patient not taking: Reported on 2022) 10 Tablet 0     Past Medical History:   Diagnosis Date    Nausea & vomiting     Raynaud's disease     Rhesus isoimmunization affecting pregnancy     A negative     Past Surgical History:   Procedure Laterality Date    HX BREAST AUGMENTATION Bilateral 2010    HX  SECTION  ,     HX HEENT      wisdom teeth extraction    HX HERNIA REPAIR  2021     No Known Allergies    REVIEW OF SYSTEMS:                                        POSITIVE= bold text  Negative = regular text    General:                     fever, chills, sweats, generalized weakness, weight loss/gain,                                       loss of appetite   Eyes:                           blurred vision, eye pain, loss of vision, double vision  ENT:                            rhinorrhea, pharyngitis   Respiratory:               cough, sputum production, SOB, ERICKSON, wheezing, pleuritic pain   Cardiology:                chest pain, palpitations, orthopnea, PND, edema, syncope   Gastrointestinal:       abdominal pain , N/V, diarrhea, dysphagia, constipation, bleeding   Genitourinary:           frequency, urgency, dysuria, hematuria, incontinence   Muskuloskeletal :      arthralgia, myalgia, back pain  Hematology:              easy bruising, nose or gum bleeding, lymphadenopathy   Dermatological:         rash, ulceration, pruritis, color change / jaundice  Endocrine:                 hot flashes or polydipsia   Neurological:             headache, dizziness, confusion, focal weakness, paresthesia,                                      Speech difficulties, memory loss, gait difficulty, tremors  Psychological:          Feelings of anxiety, depression, agitation        Social History     Socioeconomic History    Marital status:    Tobacco Use    Smoking status: Never Smoker    Smokeless tobacco: Never Used   Vaping Use    Vaping Use: Never used   Substance and Sexual Activity    Alcohol use: Yes     Alcohol/week: 1.0 standard drink     Types: 1 Standard drinks or equivalent per week     Comment: occ    Drug use: No    Sexual activity: Yes     Partners: Male     Family History   Problem Relation Age of Onset    Diabetes Mother     Hypertension Mother     Hypertension Father     Clotting Disorder Father         blood clot in leg after surgery    Thyroid Disease Father     OSTEOARTHRITIS Father     Psychiatric Disorder Sister     Other Maternal Grandfather         abdominal anuerysm    Diabetes Maternal Grandfather         prostate    Diabetes Maternal Grandmother     Diabetes Paternal Grandfather         prostate       OBJECTIVE:     Visit Vitals  /70 (BP 1 Location: Left upper arm, BP Patient Position: Sitting, BP Cuff Size: Small adult)   Pulse 88   Temp 97.8 °F (36.6 °C)   Resp 16   Ht 5' 6\" (1.676 m)   Wt 113 lb 3.2 oz (51.3 kg)   LMP 12/25/2021 (Exact Date)   SpO2 100%   Breastfeeding No   BMI 18.27 kg/m²       Constitutional: She appears well nourished, of stated age, and dressed appropriately. Eyes: Sclera anicteric, PERRLA, EOMI  Neck: Supple without lymphadenopathy. Thyroid normal, No JVD or bruits  Respiratory: Clear to ascultation X5, normal inspiratory effort, no adventitious breath sounds. Cardiovascular: Regular rate and rhythm, no murmurs, rubs or gallops, PMI not displaced, No thrills, no peripheral edema  Neuro: Non-focal exam, A & O X 3. Fine tremors to hands noted. Psychiatric: Appropriate affect and demeanor, pleasant and cooperative. Patient's thought content and thought processing appear to be within normal limits. Patient denies any auditory or visual hallucinations or any homicidal or suicidal thoughts. ASSESSMENT/PLAN:     ICD-10-CM ICD-9-CM    1. Panic attack as reaction to stress  F41.0 308.0 DULoxetine (CYMBALTA) 30 mg capsule    F43.0     2.  Anxiety  F41.9 300.00 DULoxetine (CYMBALTA) 30 mg capsule 3. Migraine with aura and without status migrainosus, not intractable  G43.109 346.00 ubrogepant (Ubrelvy) 100 mg tablet   4. Extrapyramidal symptom  R29.818 781.99    5. Tremor of both hands  R25.1 781.0    6. Drug-induced constipation  K59.03 564.09      E980.5      1: Patient to discontinue Effexor XR and start duloxetine 30 mg daily. 2: Patient to discontinue sumatriptan, and start Ubrelvy 100 mg to take as needed for management of migraine headaches. 3: Patient may use over-the-counter stool softener as needed for management of constipation if continues with new medication. 4: Drink plenty of water. 5: Patient to follow-up with me in approximately 6 weeks, or sooner as needed. Patient states understanding agrees with plan. Orders Placed This Encounter    ibuprofen (Motrin IB) 200 mg tablet         ATTENTION:   This medical record was transcribed using an electronic medical records system. Although proofread, it may and can contain electronic and spelling errors. Other human spelling and other errors may be present. Corrections may be executed at a later time. Please feel free to contact us for any clarifications as needed. Signed,  Ian Cordon.  Sanket Maki, MSN APRN FNP-BC

## 2022-01-13 ENCOUNTER — TELEPHONE (OUTPATIENT)
Dept: INTERNAL MEDICINE CLINIC | Age: 37
End: 2022-01-13

## 2022-01-13 NOTE — TELEPHONE ENCOUNTER
Patient called in with a question regarding her Cymbalta. Stated she is on the 7th day and is experiencing some symptoms from the medication and wondered if she could take this medication at night versus the morning. She has started having some hand tremors and hot and cold flashes and wondered if she could take the medication in the evening instead.      CB: [phone]

## 2022-01-28 ENCOUNTER — OFFICE VISIT (OUTPATIENT)
Dept: NEUROLOGY | Age: 37
End: 2022-01-28
Payer: COMMERCIAL

## 2022-01-28 VITALS
WEIGHT: 115 LBS | BODY MASS INDEX: 18.48 KG/M2 | TEMPERATURE: 97.3 F | SYSTOLIC BLOOD PRESSURE: 112 MMHG | DIASTOLIC BLOOD PRESSURE: 76 MMHG | RESPIRATION RATE: 18 BRPM | OXYGEN SATURATION: 99 % | HEART RATE: 90 BPM | HEIGHT: 66 IN

## 2022-01-28 DIAGNOSIS — G43.911 INTRACTABLE MIGRAINE WITH STATUS MIGRAINOSUS, UNSPECIFIED MIGRAINE TYPE: Primary | ICD-10-CM

## 2022-01-28 DIAGNOSIS — R25.1 TREMOR: ICD-10-CM

## 2022-01-28 DIAGNOSIS — H54.7 VISION LOSS: ICD-10-CM

## 2022-01-28 DIAGNOSIS — F41.9 ANXIETY: ICD-10-CM

## 2022-01-28 PROCEDURE — 99205 OFFICE O/P NEW HI 60 MIN: CPT | Performed by: PSYCHIATRY & NEUROLOGY

## 2022-01-28 RX ORDER — B2/MAGNESIUM CIT,OXID/FEVERFEW 200-180-50
1 TABLET ORAL DAILY
Qty: 30 TABLET | Refills: 3 | Status: SHIPPED | OUTPATIENT
Start: 2022-01-28 | End: 2022-10-12

## 2022-01-28 RX ORDER — UBROGEPANT 100 MG/1
TABLET ORAL
COMMUNITY
Start: 2022-01-08 | End: 2022-02-25 | Stop reason: ALTCHOICE

## 2022-01-28 NOTE — PROGRESS NOTES
Referring Physician: Rei Ibrahim NP     Reason for Consultation:  Migraines     Chief Complaint: migraines    History of Present Illness:   Monae Ren is a 39 y.o. female with hx of anxiety who presents to the neurology clinic with complaints of migraines. She started having migraines when she was 14years old. She has never seen her neurologist nor has she been placed on a preventative agent. Headache characteristics  Location:  Back of the head to front   Character: pressure throbbing   Radiation: right side, back to the front of the head  Intensity: On average   Frequency: around menstrual cycle, twice per week  Duration: 48 hours or more   Triggers: stress, hormonal, bright light. No worsening reported with cough/sneeze, bending over  Alleviating factors: motrin, sleep   Aura: vision loss in one eye, facial numbness, develops a hemianopia   Associated Sx with HA: nausea/vomiting, + phonophotophobia.   Denies unilateral ptosis, conjunctival injection, lacrimation, sweating  Prior treatments: fioricet, ubrevly, sumatriptan   Prior Imaging: none   Caffeine use: 4 cups of black tea   H/O Head trauma: none   Depressive or anxiety Sx: anxiety   Stop BANG score (KATHY): 0  Diet: well balanced  Exercise: moderate  Sleep is poor, wakes up multiple times, has trouble getting back to sleep     Recently diagnosed with anxiety and started on Effexor which improved the headaches however started having tremors       Past Medical History:   Diagnosis Date    Nausea & vomiting     Raynaud's disease     Rhesus isoimmunization affecting pregnancy     A negative        Past Surgical History:   Procedure Laterality Date    HX BREAST AUGMENTATION Bilateral     HX  SECTION  ,     HX HEENT      wisdom teeth extraction    HX HERNIA REPAIR  2021        Family History   Problem Relation Age of Onset    Diabetes Mother     Hypertension Mother     Hypertension Father     Clotting Disorder Father blood clot in leg after surgery    Thyroid Disease Father     OSTEOARTHRITIS Father     Psychiatric Disorder Sister     Other Maternal Grandfather         abdominal anuerysm    Diabetes Maternal Grandfather         prostate    Diabetes Maternal Grandmother     Diabetes Paternal Grandfather         prostate        Social History     Tobacco Use    Smoking status: Never Smoker    Smokeless tobacco: Never Used   Substance Use Topics    Alcohol use: Yes     Alcohol/week: 1.0 standard drink     Types: 1 Standard drinks or equivalent per week     Comment: occ        No Known Allergies     Prior to Admission medications    Medication Sig Start Date End Date Taking? Authorizing Provider   Lisa Mall 100 mg tablet  1/8/22  Yes Provider, Historical   ibuprofen (Motrin IB) 200 mg tablet Take  by mouth. Yes Provider, Historical   DULoxetine (CYMBALTA) 30 mg capsule Take 1 Capsule by mouth daily. 1/5/22  Yes Kenzie SWANN NP   promethazine (PHENERGAN) 25 mg tablet Take 1 Tablet by mouth every eight (8) hours as needed for Nausea. 11/11/21  Yes Kenzie SWANN NP   ALPRAZolam Myranda Spearing) 0.5 mg tablet Take 0.5-1 Tablets by mouth three (3) times daily as needed for Anxiety. Max Daily Amount: 1.5 mg. Indications: SEVERE ANXIETY 11/11/21  Yes Kenzie SWANN NP   butalbital-acetaminophen-caff (FIORICET) -40 mg per capsule Take 1 Capsule by mouth every six (6) hours as needed for Headache. Patient not taking: Reported on 1/5/2022 11/3/21   Nereyda Pat MD   ondansetron (Zofran ODT) 4 mg disintegrating tablet Take 1 Tablet by mouth every eight (8) hours as needed for Nausea.   Patient not taking: Reported on 1/5/2022 11/3/21   Nereyda Pat MD       Review of Systems:  General, constitutional: negative  Eyes, vision: negative  Ears, nose, throat: negative  Cardiovascular, heart: negative  Respiratory: negative  Gastrointestinal: negative  Genitourinary: negative  Musculoskeletal: negative  Skin and integumentary: negative  Psychiatric: negative  Endocrine: negative  Neurological: negative, except for HPI  Hematologic/lymphatic: negative  Allergy/immunology: negative    Visit Vitals  /76   Pulse 90   Temp 97.3 °F (36.3 °C) (Temporal)   Resp 18   Ht 5' 6\" (1.676 m)   Wt 115 lb (52.2 kg)   SpO2 99%   BMI 18.56 kg/m²       Physical Exam:  General:  no acute distress  Neck: no carotid bruits  Lungs: clear to auscultation  Heart:  no murmurs, regular rate and rhythm   Lower extremity: no edema    Neurological exam:  Mental Status: Awake, alert, oriented to person, place and time  Attention and Concentration: able to state the days of the week backwards   Speech and Language: No dysarthria. Able to name, repeat and follow commands   Fund of knowledge was preserved    Cranial nerves: II-XII  Pupils equal and reactive, visual fields intact by confrontation   Fundus: venous pulsations and sharp disc margins noted bilaterally  Extraocular movements intact, no evidence of nystagmus or ptosis   Facial sensation intact   Facial movements symmetric   Hearing intact to soft rub bilaterally   Shoulder shrug symmetric and strong   Tongue protrusion full and midline without fasciculation or atrophy    Motor:   Normal tone and Bulk   Drift: No evidence of pronator drift     Strength testing:   deltoid triceps biceps Wrist ext. Wrist flex. intrinsics   Right 5 5 5 5 5 5   Left 5 5 5 5 5 5      Hip flex. Hip ext. Knee ext. Knee flex Dorsi flex Plantar flex   Right  5 5 5 5 5 5   Left  5 5 5 5 5 5       Sensory:  Upper extremity: intact to pinprick, light touch, and vibration > 10 seconds  Lower extremity: intact to pinprick, light touch, and vibration > 10 seconds    Reflexes:     Biceps Triceps  Brachiorad Patellar Achilles Plantar Hoffmans   Right  2 2 2 2 2 Down Neg   Left  2 2 2 2 2 Down Neg        Cerebellar testing:  No dysmetria.  Normal rapid alternating movements; finger-to-nose and heel-to- shin testing are within normal limits. Romberg: absent    Gait: steady    Data:   INTERNAL RECORDS:  The patient's electronic medical record was reviewed. The relevant details include:    Lab Results   Component Value Date/Time    Sodium 134 (L) 11/03/2021 03:30 AM    Potassium 4.1 11/03/2021 03:30 AM    Chloride 106 11/03/2021 03:30 AM    Glucose 139 (H) 11/03/2021 03:30 AM    BUN 11 11/03/2021 03:30 AM    Creatinine 0.67 11/03/2021 03:30 AM    Calcium 9.9 11/03/2021 03:30 AM    WBC 8.5 11/03/2021 03:30 AM    HCT 38.8 11/03/2021 03:30 AM    HGB 13.0 11/03/2021 03:30 AM    PLATELET 678 60/80/4560 03:30 AM       CT Results (maximum last 3): No results found for this or any previous visit. MRI Results (maximum last 3): No results found for this or any previous visit. Assessment and Plan   Fletcher Burgos is a 39 y.o. female with hx of anxiety who presents to the neurology clinic with complaints of migraines goal exam is unremarkable. She does have features that you concerning for complex migraines.  -For abortive and preventative therapy, I have recommended that patient trial Nurtec 75 mg every other day. We discussed that this medication should be taken every other day to prevent and treat migraines.  -We discussed that if insurance does not cover the Nurtec, we should trial amitriptyline 10 mg at bedtime to help prevent migraine headaches.  -I have also recommended that patient trial dry needling and physical therapy for her migraines as there is likely a component of tension causing her migraines.   - Given that she has never had imaging of his head I do recommend obtaining an MRI of his brain with and without contrast to ensure that there is no lesion mass or stroke resulting in his headaches.  -We discussed the importance of a proper diet including plenty of fruits and vegetables as this can help with headache prevention.  -We discussed the importance of staying hydrated and drinking at least 32 to 64 ounces of water daily as this can be a cause of tension type headaches.  -We also discussed the importance of keeping a headache journal or log to identify triggers.  -We discussed the importance of sleep hygiene and attempting to get at least 6 to 8 hours of sleep daily to help with headache prevention. Tremor does appear to be medication related however her mother does have a history of a benign tremor.  -I recommended patient to stop the Cymbalta and as she has been taking it for less than 1 month this can be stopped. She is also on a very low dose. If she were to develop symptoms we may need to do a slower taper. Anxiety I have recommended that patient see a psychologist to manage her stress as well as her anxiety. This may help with prevention of her headaches as well. Patient Active Problem List   Diagnosis Code    Pregnancy Z34.90         I have discussed the diagnosis with the patient and the intended plan as seen in the above orders. Patient is in agreement. The patient has received an after-visit summary and questions were answered concerning future plans. I have discussed medication side effects and warnings with the patient as well.         Signed By:  Cyn Henriquez MD     January 28, 2022

## 2022-01-28 NOTE — PATIENT INSTRUCTIONS
Migraine Headache: Care Instructions  Overview     Migraines are painful, throbbing headaches that often start on one side of the head. They may cause nausea and vomiting and make you sensitive to light, sound, or smell. Without treatment, migraines can last from 4 hours to a few days. Medicines can help prevent migraines or stop them after they have started. Your doctor can help you find which ones work best for you. Follow-up care is a key part of your treatment and safety. Be sure to make and go to all appointments, and call your doctor if you are having problems. It's also a good idea to know your test results and keep a list of the medicines you take. How can you care for yourself at home? · Do not drive if you have taken a prescription pain medicine. · Rest in a quiet, dark room until your headache is gone. Close your eyes, and try to relax or go to sleep. Don't watch TV or read. · Put a cold, moist cloth or cold pack on the painful area for 10 to 20 minutes at a time. Put a thin cloth between the cold pack and your skin. · Use a warm, moist towel or a heating pad set on low to relax tight shoulder and neck muscles. · Have someone gently massage your neck and shoulders. · Take your medicines exactly as prescribed. Call your doctor if you think you are having a problem with your medicine. You will get more details on the specific medicines your doctor prescribes. · Don't take medicine for headache pain too often. Talk to your doctor if you are taking medicine more than 2 days a week to stop a headache. Taking too much pain medicine can lead to more headaches. These are called medicine-overuse headaches. To prevent migraines  · Keep a headache diary so you can figure out what triggers your headaches. Avoiding triggers may help you prevent headaches. Record when each headache began, how long it lasted, and what the pain was like.  Write down any other symptoms you had with the headache, such as nausea, flashing lights or dark spots, or sensitivity to bright light or loud noise. Note if the headache occurred near your period. List anything that might have triggered the headache. Triggers may include certain foods (chocolate, cheese, wine) or odors, smoke, bright light, stress, or lack of sleep. · If your doctor has prescribed medicine for your migraines, take it as directed. You may have medicine that you take only when you get a migraine and medicine that you take all the time to help prevent migraines. ? If your doctor has prescribed medicine for when you get a headache, take it at the first sign of a migraine, unless your doctor has given you other instructions. ? If your doctor has prescribed medicine to prevent migraines, take it exactly as prescribed. Call your doctor if you think you are having a problem with your medicine. · Find healthy ways to deal with stress. Migraines are most common during or right after stressful times. Try finding ways to reduce stress like practicing mindfulness or deep breathing exercises. · Get plenty of sleep and exercise. But be careful to not push yourself too hard during exercise. It may trigger a headache. · Eat meals on a regular schedule. Avoid foods and drinks that often trigger migraines. These include chocolate, alcohol (especially red wine and port), aspartame, monosodium glutamate (MSG), and some additives found in foods (such as hot dogs, pak, cold cuts, aged cheeses, and pickled foods). · Limit caffeine. Don't drink too much coffee, tea, or soda. But don't quit caffeine suddenly. That can also give you migraines. · Do not smoke or allow others to smoke around you. If you need help quitting, talk to your doctor about stop-smoking programs and medicines. These can increase your chances of quitting for good. · If you are taking birth control pills or hormone therapy, talk to your doctor about whether they are triggering your migraines.   When should you call for help? Call 911 anytime you think you may need emergency care. For example, call if:    · You have signs of a stroke. These may include:  ? Sudden numbness, paralysis, or weakness in your face, arm, or leg, especially on only one side of your body. ? Sudden vision changes. ? Sudden trouble speaking. ? Sudden confusion or trouble understanding simple statements. ? Sudden problems with walking or balance. ? A sudden, severe headache that is different from past headaches. Call your doctor now or seek immediate medical care if:    · You have new or worse nausea and vomiting.     · You have a new or higher fever.     · Your headache gets much worse. Watch closely for changes in your health, and be sure to contact your doctor if:    · You are not getting better after 2 days (48 hours). Where can you learn more? Go to http://www.gray.com/  Enter K175 in the search box to learn more about \"Migraine Headache: Care Instructions. \"  Current as of: April 8, 2021               Content Version: 13.0  © 2006-2021 [employer]. Care instructions adapted under license by [employer] (which disclaims liability or warranty for this information). If you have questions about a medical condition or this instruction, always ask your healthcare professional. [address] any warranty or liability for your use of this information.

## 2022-02-18 ENCOUNTER — HOSPITAL ENCOUNTER (OUTPATIENT)
Dept: MRI IMAGING | Age: 37
Discharge: HOME | End: 2022-02-18
Attending: PSYCHIATRY & NEUROLOGY
Payer: COMMERCIAL

## 2022-02-18 DIAGNOSIS — G43.911 INTRACTABLE MIGRAINE WITH STATUS MIGRAINOSUS, UNSPECIFIED MIGRAINE TYPE: ICD-10-CM

## 2022-02-18 DIAGNOSIS — H54.7 VISION LOSS: ICD-10-CM

## 2022-02-18 PROCEDURE — 70553 MRI BRAIN STEM W/O & W/DYE: CPT | Performed by: PSYCHIATRY & NEUROLOGY

## 2022-02-24 ENCOUNTER — OFFICE VISIT (OUTPATIENT)
Dept: INTERNAL MEDICINE CLINIC | Age: 37
End: 2022-02-24
Payer: COMMERCIAL

## 2022-02-24 VITALS
HEIGHT: 66 IN | RESPIRATION RATE: 16 BRPM | SYSTOLIC BLOOD PRESSURE: 119 MMHG | BODY MASS INDEX: 18.68 KG/M2 | TEMPERATURE: 98.3 F | DIASTOLIC BLOOD PRESSURE: 73 MMHG | WEIGHT: 116.2 LBS | OXYGEN SATURATION: 100 % | HEART RATE: 86 BPM

## 2022-02-24 DIAGNOSIS — F41.9 ANXIETY: Primary | ICD-10-CM

## 2022-02-24 DIAGNOSIS — R25.1 TREMOR OF BOTH HANDS: ICD-10-CM

## 2022-02-24 DIAGNOSIS — J06.9 VIRAL UPPER RESPIRATORY TRACT INFECTION: ICD-10-CM

## 2022-02-24 DIAGNOSIS — G43.109 MIGRAINE WITH AURA AND WITHOUT STATUS MIGRAINOSUS, NOT INTRACTABLE: ICD-10-CM

## 2022-02-24 DIAGNOSIS — F19.939 WITHDRAWAL FROM OTHER PSYCHOACTIVE SUBSTANCE (HCC): ICD-10-CM

## 2022-02-24 PROCEDURE — 99214 OFFICE O/P EST MOD 30 MIN: CPT | Performed by: NURSE PRACTITIONER

## 2022-02-24 RX ORDER — HYDROXYZINE HYDROCHLORIDE 10 MG/1
10 TABLET, FILM COATED ORAL
Qty: 60 TABLET | Refills: 2 | Status: SHIPPED | OUTPATIENT
Start: 2022-02-24 | End: 2022-04-10

## 2022-02-24 NOTE — PROGRESS NOTES
Lara Tellez is a 39 y.o. female    Chief Complaint   Patient presents with    Discuss Medications     6 wk follow up       Visit Vitals  /73 (BP 1 Location: Left upper arm, BP Patient Position: Sitting, BP Cuff Size: Small adult)   Pulse 86   Temp 98.3 °F (36.8 °C)   Resp 16   Ht 5' 6\" (1.676 m)   Wt 116 lb 3.2 oz (52.7 kg)   LMP 02/01/2022 (Approximate)   SpO2 100%   Breastfeeding No   BMI 18.76 kg/m²           1. Have you been to the ER, urgent care clinic since your last visit? Hospitalized since your last visit? NO    2. Have you seen or consulted any other health care providers outside of the [address] [place] [address] since your last visit? Include any pap smears or colon screening.  NO

## 2022-02-24 NOTE — PROGRESS NOTES
Chief Complaint   Patient presents with    Discuss Medications     6 wk follow up       SUBJECTIVE:    Petra Simons is a 39 y.o. female who is here today for a follow up appointment regarding her anxiety, panic episodes, migraine headaches, and tremors, and symptoms of upper respiratory tract infection for the past several days. Since our last encounter, the patient states that she saw Neurology, and was told to discontinue duloxetine. She also had an MRI last week which was \"clear. \" Since that time, she has been having \"electrical brain zaps\" for which she occasionally takes Benadryl and feels this helps to relieve these unwanted symptoms. She states these are worsened with sudden changes in her head position, or course movement of eyes/tracking. She states they are self-limiting, and do not cause any nausea or other associated symptoms. She was also placed on Nurtec for her migraine headaches, and was told that she could take \"1 tablet every other day to prevent migraines. \"  However, the patient has elected to only use this in the event of migraines rather than prophylactically. She states she has had to use it a time or two since receiving the prescription, and states that it worked well. She has plans to follow-up with her neurologist in mid May. She continues to have periodic anxiety for which she is using her alprazolam for. She states the medication is helpful when needed. She continues to have tremors in her hands which worsen with stress and frustration. She states this has remained unchanged. Additionally, the patient has had some increased runny nose, slightly sore throat, mild body aches, chills, and a cough that has been occasionally productive with green sputum. She states her symptoms started a few days ago, and has improved with over-the-counter Robitussin. She denies any fever or malaise.       Current Outpatient Medications   Medication Sig Dispense Refill    hydrOXYzine HCL (ATARAX) 10 mg tablet Take 1 Tablet by mouth every six (6) hours as needed for Anxiety for up to 45 days. 60 Tablet 2    Ubrelvy 100 mg tablet       B2-magnesium cit,oxid-feverfew (MigreLief) 200-180-50 mg tab Take 1 Tablet by mouth daily. 30 Tablet 3    rimegepant (NURTEC) 75 mg disintegrating tablet Take 1 Tablet by mouth every other day. 16 Tablet 2    ibuprofen (Motrin IB) 200 mg tablet Take  by mouth.  promethazine (PHENERGAN) 25 mg tablet Take 1 Tablet by mouth every eight (8) hours as needed for Nausea. 30 Tablet 2    ALPRAZolam (XANAX) 0.5 mg tablet Take 0.5-1 Tablets by mouth three (3) times daily as needed for Anxiety. Max Daily Amount: 1.5 mg. Indications: SEVERE ANXIETY 15 Tablet 0    ondansetron (Zofran ODT) 4 mg disintegrating tablet Take 1 Tablet by mouth every eight (8) hours as needed for Nausea.  (Patient not taking: Reported on 2022) 10 Tablet 0     Past Medical History:   Diagnosis Date    Nausea & vomiting     Raynaud's disease     Rhesus isoimmunization affecting pregnancy     A negative     Past Surgical History:   Procedure Laterality Date    HX BREAST AUGMENTATION Bilateral     HX  SECTION  ,     HX HEENT      wisdom teeth extraction    HX HERNIA REPAIR  2021     No Known Allergies    REVIEW OF SYSTEMS:                                        POSITIVE= bold text  Negative = regular text    General:                     fever, chills, sweats, generalized weakness, weight loss/gain,                                       loss of appetite   Eyes:                           blurred vision, eye pain, loss of vision, double vision  ENT:                            rhinorrhea, pharyngitis   Respiratory:               cough, sputum production, SOB, ERICKSON, wheezing, pleuritic pain   Cardiology:                chest pain, palpitations, orthopnea, PND, edema, syncope   Gastrointestinal:       abdominal pain , N/V, diarrhea, dysphagia, constipation, bleeding Genitourinary:           frequency, urgency, dysuria, hematuria, incontinence   Muskuloskeletal :      arthralgia, myalgia, back pain  Hematology:              easy bruising, nose or gum bleeding, lymphadenopathy   Dermatological:         rash, ulceration, pruritis, color change / jaundice  Endocrine:                 hot flashes or polydipsia   Neurological:             headache, dizziness, confusion, focal weakness, paresthesia,                                      Speech difficulties, memory loss, gait difficulty, tremors  Psychological:          Feelings of anxiety, depression, agitation        Social History     Socioeconomic History    Marital status:    Tobacco Use    Smoking status: Never Smoker    Smokeless tobacco: Never Used   Vaping Use    Vaping Use: Never used   Substance and Sexual Activity    Alcohol use: Yes     Alcohol/week: 1.0 standard drink     Types: 1 Standard drinks or equivalent per week     Comment: occ    Drug use: No    Sexual activity: Yes     Partners: Male     Family History   Problem Relation Age of Onset    Diabetes Mother     Hypertension Mother     Hypertension Father     Clotting Disorder Father         blood clot in leg after surgery    Thyroid Disease Father     OSTEOARTHRITIS Father     Psychiatric Disorder Sister     Other Maternal Grandfather         abdominal anuerysm    Diabetes Maternal Grandfather         prostate    Diabetes Maternal Grandmother     Diabetes Paternal Grandfather         prostate       OBJECTIVE:     Visit Vitals  /73 (BP 1 Location: Left upper arm, BP Patient Position: Sitting, BP Cuff Size: Small adult)   Pulse 86   Temp 98.3 °F (36.8 °C)   Resp 16   Ht 5' 6\" (1.676 m)   Wt 116 lb 3.2 oz (52.7 kg)   LMP 02/01/2022 (Approximate)   SpO2 100%   Breastfeeding No   BMI 18.76 kg/m²       Constitutional: She appears well nourished, of stated age, and dressed appropriately.   Eyes: Sclera anicteric, PERRLA, EOMI  ENT: Nares clear, pale and boggy turbinates present with cobblestone oropharynx. Neck: Supple without lymphadenopathy. Thyroid normal, No JVD or bruits  Respiratory: Clear to ascultation X5, normal inspiratory effort, no adventitious breath sounds. Cardiovascular: Regular rate and rhythm, no murmurs, rubs or gallops, PMI not displaced, No thrills, no peripheral edema  Neuro: Non-focal exam, A & O X 3. No significant tremors observed. Psychiatric: Appropriate affect and demeanor, pleasant and cooperative. Patient's thought content and thought processing appear to be within normal limits. In discussion, I have asked that the patient take note of her tremors after using Xanax. If tremors reduce or resolve with use, she may be a likely candidate for clonazepam low-dose twice daily. She may also be a candidate for propranolol use. We will discuss this later. ASSESSMENT/PLAN:     ICD-10-CM ICD-9-CM    1. Anxiety  F41.9 300.00 T3 TOTAL      T4, FREE      TSH 3RD GENERATION      hydrOXYzine HCL (ATARAX) 10 mg tablet      TSH 3RD GENERATION      T4, FREE      T3 TOTAL   2. 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 Migraine with aura and without status migrainosus, not intractable  G43.109 346.00    4. Tremor of both hands  R25.1 781.0 T3 TOTAL      T4, FREE      TSH 3RD GENERATION      METABOLIC PANEL, COMPREHENSIVE      METABOLIC PANEL, COMPREHENSIVE      TSH 3RD GENERATION      T4, FREE      T3 TOTAL   5. Viral upper respiratory tract infection  J06.9 465.9      1: We will do labs today including: CMP, TSH, free T4, and total T3.  2: Patient to continue to use alprazolam as needed for breakthrough anxiety/panic episodes. 3: Patient to continue to use medication for management of migraine headaches as directed. 4: Patient to continue to treat upper respiratory infection symptomatically until clear. No antibiotics warranted at this time.   5: Patient to follow-up with me in approximately 3 months for recheck or sooner as needed. Patient states understanding and agrees with plan. Orders Placed This Encounter    T3 TOTAL    T4, FREE    TSH 3RD GENERATION    METABOLIC PANEL, COMPREHENSIVE    hydrOXYzine HCL (ATARAX) 10 mg tablet         ATTENTION:   This medical record was transcribed using an electronic medical records system. Although proofread, it may and can contain electronic and spelling errors. Other human spelling and other errors may be present. Corrections may be executed at a later time. Please feel free to contact us for any clarifications as needed. Signed,  Kirby Irving.  Cristi Rivera, MSN APRN FNP-BC

## 2022-02-25 LAB
ALBUMIN SERPL-MCNC: 4.2 G/DL (ref 3.5–5)
ALBUMIN/GLOB SERPL: 1.4 {RATIO} (ref 1.1–2.2)
ALP SERPL-CCNC: 51 U/L (ref 45–117)
ALT SERPL-CCNC: 23 U/L (ref 12–78)
ANION GAP SERPL CALC-SCNC: 4 MMOL/L (ref 5–15)
AST SERPL-CCNC: 14 U/L (ref 15–37)
BILIRUB SERPL-MCNC: 1.1 MG/DL (ref 0.2–1)
BUN SERPL-MCNC: 13 MG/DL (ref 6–20)
BUN/CREAT SERPL: 20 (ref 12–20)
CALCIUM SERPL-MCNC: 9.3 MG/DL (ref 8.5–10.1)
CHLORIDE SERPL-SCNC: 106 MMOL/L (ref 97–108)
CO2 SERPL-SCNC: 27 MMOL/L (ref 21–32)
CREAT SERPL-MCNC: 0.65 MG/DL (ref 0.55–1.02)
GLOBULIN SER CALC-MCNC: 3.1 G/DL (ref 2–4)
GLUCOSE SERPL-MCNC: 87 MG/DL (ref 65–100)
POTASSIUM SERPL-SCNC: 4.4 MMOL/L (ref 3.5–5.1)
PROT SERPL-MCNC: 7.3 G/DL (ref 6.4–8.2)
SODIUM SERPL-SCNC: 137 MMOL/L (ref 136–145)
T4 FREE SERPL-MCNC: 0.9 NG/DL (ref 0.8–1.5)
TSH SERPL DL<=0.05 MIU/L-ACNC: 2.17 UIU/ML (ref 0.36–3.74)

## 2022-02-25 NOTE — PROGRESS NOTES
Kidney function, liver functions, and electrolytes are normal.    Thyroid is in normal functioning range.

## 2022-02-26 LAB — T3 SERPL-MCNC: 115 NG/DL (ref 71–180)

## 2022-03-18 PROBLEM — F19.939 DRUG WITHDRAWAL SYNDROME (HCC): Status: ACTIVE | Noted: 2022-02-24

## 2022-03-18 PROBLEM — F41.9 ANXIETY: Status: ACTIVE | Noted: 2022-02-24

## 2022-03-18 PROBLEM — R25.1 TREMOR OF BOTH HANDS: Status: ACTIVE | Noted: 2022-02-24

## 2022-03-19 PROBLEM — Z34.90 PREGNANCY: Status: ACTIVE | Noted: 2019-03-07

## 2022-03-19 PROBLEM — G43.109 MIGRAINE WITH AURA AND WITHOUT STATUS MIGRAINOSUS, NOT INTRACTABLE: Status: ACTIVE | Noted: 2022-02-24

## 2022-04-15 ENCOUNTER — PATIENT MESSAGE (OUTPATIENT)
Dept: INTERNAL MEDICINE CLINIC | Age: 37
End: 2022-04-15

## 2022-04-15 DIAGNOSIS — F43.0 PANIC ATTACK AS REACTION TO STRESS: ICD-10-CM

## 2022-04-15 DIAGNOSIS — F41.9 ANXIETY: ICD-10-CM

## 2022-04-15 DIAGNOSIS — F41.0 PANIC ATTACK AS REACTION TO STRESS: ICD-10-CM

## 2022-04-18 RX ORDER — ALPRAZOLAM 0.5 MG/1
.25-.5 TABLET ORAL
Qty: 15 TABLET | Refills: 0 | Status: SHIPPED | OUTPATIENT
Start: 2022-04-18 | End: 2022-05-31 | Stop reason: SDUPTHER

## 2022-04-18 NOTE — TELEPHONE ENCOUNTER
From: Roney Long  To: Nelli Ernst NP  Sent: 4/15/2022 7:23 PM EDT  Subject: [facility] refill    Hello [employer],    Would it be possible to have a refill on my Xanax Rx? My original Rx from 11/21 did not allow any refills on the bottle and I have a few left.      Thank you,  Roney Long L/m for pt to call so we can schedule f/u pap

## 2022-04-22 DIAGNOSIS — F41.9 ANXIETY: ICD-10-CM

## 2022-04-22 DIAGNOSIS — F41.0 PANIC ATTACK AS REACTION TO STRESS: ICD-10-CM

## 2022-04-22 DIAGNOSIS — F43.0 PANIC ATTACK AS REACTION TO STRESS: ICD-10-CM

## 2022-05-13 ENCOUNTER — VIRTUAL VISIT (OUTPATIENT)
Dept: NEUROLOGY | Age: 37
End: 2022-05-13
Payer: COMMERCIAL

## 2022-05-13 ENCOUNTER — TELEPHONE (OUTPATIENT)
Dept: NEUROLOGY | Age: 37
End: 2022-05-13

## 2022-05-13 DIAGNOSIS — G43.911 INTRACTABLE MIGRAINE WITH STATUS MIGRAINOSUS, UNSPECIFIED MIGRAINE TYPE: Primary | ICD-10-CM

## 2022-05-13 DIAGNOSIS — F41.9 ANXIETY: ICD-10-CM

## 2022-05-13 DIAGNOSIS — R26.81 GAIT INSTABILITY: ICD-10-CM

## 2022-05-13 DIAGNOSIS — R25.1 TREMOR: ICD-10-CM

## 2022-05-13 DIAGNOSIS — M62.838 MUSCLE SPASMS OF NECK: ICD-10-CM

## 2022-05-13 PROCEDURE — 99214 OFFICE O/P EST MOD 30 MIN: CPT | Performed by: PSYCHIATRY & NEUROLOGY

## 2022-05-13 RX ORDER — HYDROXYZINE HYDROCHLORIDE 10 MG/1
10 TABLET, FILM COATED ORAL
COMMUNITY
End: 2022-10-12

## 2022-05-13 RX ORDER — TIZANIDINE 2 MG/1
2 TABLET ORAL
Qty: 10 TABLET | Refills: 1 | Status: SHIPPED | OUTPATIENT
Start: 2022-05-13

## 2022-05-13 NOTE — PROGRESS NOTES
Neurology Note    Patient ID:  Ariane Garcia  [id number]  2 y.o.  1985      Date of Consultation:  May 13, 2022    This is a telemedicine visit that was performed with in the originating site at patient's home and the distance site at [facility] outpatient clinic at [facility].  This telemedicine visit utilized synchronous (real-time) audio-video technology. Verbal consent to participate in the video visit was obtained. This visit occurred during the corona (COVID -19) public health emergency. I discussed with the patient the nature of our telemedicine visit, that:  - I would evaluate the patient and recommend diagnostic and treatment based on my assessment  - Our sessions are not being recorded and that personal health information is protected  - Our team will provide follow-up care in person if and when the patient needs it. Consent:  The patient is aware that this patient-initiated Telehealth encounter is a billable service, with coverage as determined by the patient's insurance carrier. The patient is aware that they may receive a bill and has provided verbal consent to proceed:     Subjective:     Chief Complaint: migraines     History of Present Illness:   Ariane Garcia is a 39 y.o. female with hx of anxiety who presents to the neurology clinic with complaints of migraines. She started having migraines when she was 14years old. She has never seen her neurologist nor has she been placed on a preventative agent.     Headache characteristics  Location:  Back of the head to front   Character: pressure throbbing   Radiation: right side, back to the front of the head  Intensity: On average   Frequency: around menstrual cycle, twice per week  Duration: 48 hours or more   Triggers: stress, hormonal, bright light.  No worsening reported with cough/sneeze, bending over  Alleviating factors: motrin, sleep   Aura: vision loss in one eye, facial numbness, develops a hemianopia   Associated Sx with HA: nausea/vomiting, + phonophotophobia. Denies unilateral ptosis, conjunctival injection, lacrimation, sweating  Prior treatments: fioricet, ubrevly, sumatriptan   Prior Imaging: none   Caffeine use: 4 cups of black tea   H/O Head trauma: none   Depressive or anxiety Sx: anxiety   Stop BANG score (KATHY): 0  Diet: well balanced  Exercise: moderate  Sleep is poor, wakes up multiple times, has trouble getting back to sleep      Recently diagnosed with anxiety and started on Effexor which improved the headaches however started having tremors     Interval hx  States that she is taking the nurtec as needed and it has helped tremendously. She takes 4 a month at most with improvement of her headaches. She reports that sleep is going issue however she gets interrupted multiple times at night due to her dog who is diabetic and her child. Also reports that she had an accident where she injured the neck and every 6-8months when she turns her head she develops nausea, gait instability and pain in the neck lasting 1-2 weeks. She reports she needs to wear a c-collar for improvement of the symptoms.     Past Medical History:   Diagnosis Date    Nausea & vomiting     Raynaud's disease     Rhesus isoimmunization affecting pregnancy     A negative        Past Surgical History:   Procedure Laterality Date    HX BREAST AUGMENTATION Bilateral     HX  SECTION  2013    HX HEENT      wisdom teeth extraction    HX HERNIA REPAIR  2021        Family History   Problem Relation Age of Onset    Diabetes Mother     Hypertension Mother     Hypertension Father     Clotting Disorder Father         blood clot in leg after surgery    Thyroid Disease Father     OSTEOARTHRITIS Father     Psychiatric Disorder Sister     Other Maternal Grandfather         abdominal anuerysm    Diabetes Maternal Grandfather         prostate    Diabetes Maternal Grandmother     Diabetes Paternal Grandfather         prostate Social History     Tobacco Use    Smoking status: Never Smoker    Smokeless tobacco: Never Used   Substance Use Topics    Alcohol use: Yes     Alcohol/week: 1.0 standard drink     Types: 1 Standard drinks or equivalent per week     Comment: occ        No Known Allergies     Prior to Admission medications    Medication Sig Start Date End Date Taking? Authorizing Provider   hydrOXYzine HCL (ATARAX) 10 mg tablet Take 10 mg by mouth nightly as needed. Yes Provider, Historical   ALPRAZolam (XANAX) 0.5 mg tablet Take 0.5-1 Tablets by mouth three (3) times daily as needed for Anxiety. Max Daily Amount: 1.5 mg. Indications: SEVERE ANXIETY 4/18/22  Yes Nneka SWANN NP   B2-magnesium cit,oxid-feverfew (MigreLief) 200-180-50 mg tab Take 1 Tablet by mouth daily. 1/28/22  Yes Manuel Beth MD   rimegepant (NURTEC) 75 mg disintegrating tablet Take 1 Tablet by mouth every other day. 1/28/22  Yes Manuel Beth MD   ibuprofen (Motrin IB) 200 mg tablet Take  by mouth. Yes Provider, Historical   promethazine (PHENERGAN) 25 mg tablet Take 1 Tablet by mouth every eight (8) hours as needed for Nausea. 11/11/21  Yes Nneka SWANN NP       Review of Systems:    General, constitutional: negative  Eyes, vision: negative  Ears, nose, throat: negative  Cardiovascular, heart: negative  Respiratory: negative  Gastrointestinal: negative  Genitourinary: negative  Musculoskeletal: negative  Skin and integumentary: negative  Psychiatric: negative  Endocrine: negative  Neurological: negative, except for HPI  Hematologic/lymphatic: negative  Allergy/immunology: negative    Objective:     No vital signs were obtained via telemedicine today. There are limitations to the neurological examination due to the technological features of telemedicine     Physical Exam:  General:  appears well nourished in no acute distress  Respiratory:  good respiratory effort. No labored breathing  Skin: intact.  No obvious erythematous rashes     Neurological exam:  Awake, alert, oriented to person, place and time  Attention and concentration were intact  Language was intact. There was no aphasia  Speech: no dysarthria  Fund of knowledge was preserved     Cranial nerves:   Visual fields were full  Eomi, no evidence of nystagmus  Facial motor: normal and symmetric  Hearing intact  Tongue: midline without fasciculations     Motor:   Appears full strength in the upper and lower extremities. No drift was noted     Sensory:  Intact per patient     Reflexes:  Unable to obtain via telemedicine     Cerebellar testing:  no tremor apparent, finger/nose and sofía were intact     Romberg: absent     Gait: steady. Labs:     Lab Results   Component Value Date/Time    Sodium 137 02/24/2022 02:58 PM    Potassium 4.4 02/24/2022 02:58 PM    Chloride 106 02/24/2022 02:58 PM    Glucose 87 02/24/2022 02:58 PM    BUN 13 02/24/2022 02:58 PM    Creatinine 0.65 02/24/2022 02:58 PM    Calcium 9.3 02/24/2022 02:58 PM    WBC 8.5 11/03/2021 03:30 AM    HCT 38.8 11/03/2021 03:30 AM    HGB 13.0 11/03/2021 03:30 AM    PLATELET 325 32/92/3433 03:30 AM       Imaging:    Results from Hospital Encounter encounter on 02/18/22    MRI BRAIN W WO CONT    Narrative  INDICATION:  eval for demylination, hyperintensities    COMPARISON:  None    TECHNIQUE:  Multiplanar multisequence acquisition without and with IV contrast  of the brain. FINDINGS:    Ventricles:  Midline, no hydrocephalus. Brain Parenchyma/Brainstem:  Normal for age. No acute infarction. Intracranial Hemorrhage:  None. Basal Cisterns:  Normal.  Flow Voids:  Normal.  Post Contrast:  No abnormal parenchymal or meningeal enhancement. Additional Comments:  Focal area of artifact in the right superior frontal  scalp. Numerous small round foci of signal abnormality in the scalp may  represent sebaceous cysts. Impression  No significant abnormality or acute process. Specifically, no white matter  lesions.       No results found for this or any previous visit.         Assessment and Plan   Jhoan Cr is a 39 y.o. female with hx of anxiety who presents to the neurology clinic with complaints of migraines goal exam is unremarkable. She does have features that you concerning for complex migraines.  -For abortive and preventative therapy, I have recommended that patient trial Nurtec 75 mg every other day. We discussed that this medication should be taken every other day to prevent and treat migraines.  -We discussed that if insurance does not cover the Nurtec, we should trial amitriptyline 10 mg at bedtime to help prevent migraine headaches.  -I have also recommended that patient trial dry needling and physical therapy for her migraines as there is likely a component of tension causing her migraines. - Given that she has never had imaging of his head I do recommend obtaining an MRI of his brain with and without contrast to ensure that there is no lesion mass or stroke resulting in his headaches.  -We discussed the importance of a proper diet including plenty of fruits and vegetables as this can help with headache prevention.  -We discussed the importance of staying hydrated and drinking at least 32 to 64 ounces of water daily as this can be a cause of tension type headaches.  -We also discussed the importance of keeping a headache journal or log to identify triggers.  -We discussed the importance of sleep hygiene and attempting to get at least 6 to 8 hours of sleep daily to help with headache prevention. Neck pain associated with gait instability nausea vomiting: Etiology is unclear however there may be concern for cerebellar tonsillar herniation which could be causing the symptoms?  -I have ordered an MRI of the cervical spine to determine if there are any abnormalities.     Low back pain has been ongoing for several years.  -We will obtain an MRI of the lumbar spine to determine if there are any disc bulges or evidence of radiculopathy causing her symptoms.     Tremor does appear to be medication related however her mother does have a history of a benign tremor.  -Continue to monitor however it does appear to be an essential tremor.     Anxiety I have recommended that patient see a psychologist to manage her stress as well as her anxiety. This may help with prevention of her headaches as well. Patient Active Problem List   Diagnosis Code    Pregnancy Z34.90    Anxiety F41.9    Migraine with aura and without status migrainosus, not intractable G43. 109    Drug withdrawal syndrome (HCC) F19.239    Tremor of both hands R25.1                   Signed By:  Mary Salinas MD     May 13, 2022

## 2022-05-19 NOTE — TELEPHONE ENCOUNTER
Requested Prescriptions     Pending Prescriptions Disp Refills    rimegepant (NURTEC) 75 mg disintegrating tablet 16 Tablet 2     Sig: Take 1 Tablet by mouth every other day.     Last visit VV 5/13/22 Last fill 1/28/22

## 2022-05-31 ENCOUNTER — OFFICE VISIT (OUTPATIENT)
Dept: INTERNAL MEDICINE CLINIC | Age: 37
End: 2022-05-31
Payer: COMMERCIAL

## 2022-05-31 ENCOUNTER — APPOINTMENT (OUTPATIENT)
Dept: MRI IMAGING | Age: 37
End: 2022-05-31
Attending: PSYCHIATRY & NEUROLOGY

## 2022-05-31 VITALS
OXYGEN SATURATION: 100 % | TEMPERATURE: 98.5 F | HEART RATE: 94 BPM | BODY MASS INDEX: 18.03 KG/M2 | HEIGHT: 66 IN | WEIGHT: 112.2 LBS | DIASTOLIC BLOOD PRESSURE: 70 MMHG | RESPIRATION RATE: 16 BRPM | SYSTOLIC BLOOD PRESSURE: 114 MMHG

## 2022-05-31 DIAGNOSIS — F41.0 PANIC ATTACK AS REACTION TO STRESS: ICD-10-CM

## 2022-05-31 DIAGNOSIS — G47.09 OTHER INSOMNIA: ICD-10-CM

## 2022-05-31 DIAGNOSIS — G43.911 INTRACTABLE MIGRAINE WITH STATUS MIGRAINOSUS, UNSPECIFIED MIGRAINE TYPE: ICD-10-CM

## 2022-05-31 DIAGNOSIS — F43.0 PANIC ATTACK AS REACTION TO STRESS: ICD-10-CM

## 2022-05-31 DIAGNOSIS — F41.9 ANXIETY: Primary | ICD-10-CM

## 2022-05-31 DIAGNOSIS — F43.0 STRESS REACTION: ICD-10-CM

## 2022-05-31 PROCEDURE — 99214 OFFICE O/P EST MOD 30 MIN: CPT | Performed by: NURSE PRACTITIONER

## 2022-05-31 RX ORDER — ZOLPIDEM TARTRATE 10 MG/1
5-10 TABLET ORAL
Qty: 30 TABLET | Refills: 2 | Status: SHIPPED | OUTPATIENT
Start: 2022-05-31 | End: 2022-10-12

## 2022-05-31 RX ORDER — ALPRAZOLAM 0.5 MG/1
.25-.5 TABLET ORAL
Qty: 30 TABLET | Refills: 0 | Status: SHIPPED | OUTPATIENT
Start: 2022-05-31 | End: 2022-10-12 | Stop reason: SDUPTHER

## 2022-05-31 NOTE — PROGRESS NOTES
Chief Complaint   Patient presents with    Anxiety     3M follow up       SUBJECTIVE:    Brandon Gorman is a 39 y.o. female who is here today for a follow up appointment regarding her migraine headaches, anxiety attacks, sleep issues, and stressors. She states she is feeling okay overall, and taking her medication as prescribed. The patient was seen by Dr. Mary Vaca (neurology) for follow-up regarding her migraine headaches. She has been responding well to the use of her Nurtec, and has been using it on an as-needed basis for migraine headaches. She states it has been quite effective, and she does not need to use it [place] other day. \"    She continues to take the alprazolam as needed for breakthrough anxiety issues. She states the medication is effective, and she has been using it as prescribed. She typically only uses 1/2 tablet as needed. Additionally, she continues to have a great deal of difficulty falling asleep at night. She is wanting to use something to help with this if possible, and something more specific than using Xanax. She has a very young child who often gets up all hours of the night and disrupts her sleep. She has been working with her  to sign each other \"child duty. \"  On nights that she is not responsible to respond, she is requesting a medication to help her to fall asleep more quickly. Current Outpatient Medications   Medication Sig Dispense Refill    zolpidem (AMBIEN) 10 mg tablet Take 0.5-1 Tablets by mouth nightly as needed for Sleep. Max Daily Amount: 10 mg. Indications: difficulty falling asleep 30 Tablet 2    ALPRAZolam (XANAX) 0.5 mg tablet Take 0.5-1 Tablets by mouth two (2) times daily as needed for Anxiety. Max Daily Amount: 1 mg. Indications: SEVERE ANXIETY 30 Tablet 0    rimegepant (NURTEC) 75 mg disintegrating tablet Take 1 Tablet by mouth every other day. 16 Tablet 2    hydrOXYzine HCL (ATARAX) 10 mg tablet Take 10 mg by mouth nightly as needed.  tiZANidine (ZANAFLEX) 2 mg tablet Take 1 Tablet by mouth three (3) times daily as needed for Muscle Spasm(s). 10 Tablet 1    B2-magnesium cit,oxid-feverfew (MigreLief) 200-180-50 mg tab Take 1 Tablet by mouth daily. 30 Tablet 3    ibuprofen (Motrin IB) 200 mg tablet Take  by mouth.  promethazine (PHENERGAN) 25 mg tablet Take 1 Tablet by mouth every eight (8) hours as needed for Nausea.  30 Tablet 2     Past Medical History:   Diagnosis Date    Nausea & vomiting     Raynaud's disease     Rhesus isoimmunization affecting pregnancy     A negative     Past Surgical History:   Procedure Laterality Date    HX BREAST AUGMENTATION Bilateral     HX  SECTION  ,     HX HEENT      wisdom teeth extraction    HX HERNIA REPAIR  2021     No Known Allergies    REVIEW OF SYSTEMS:                                        POSITIVE= bold text  Negative = regular text    General:                     fever, chills, sweats, generalized weakness, weight loss/gain,                                       loss of appetite   Eyes:                           blurred vision, eye pain, loss of vision, double vision  ENT:                            rhinorrhea, pharyngitis   Respiratory:               cough, sputum production, SOB, ERICKSON, wheezing, pleuritic pain   Cardiology:                chest pain, palpitations, orthopnea, PND, edema, syncope   Gastrointestinal:       abdominal pain , N/V, diarrhea, dysphagia, constipation, bleeding   Genitourinary:           frequency, urgency, dysuria, hematuria, incontinence   Muskuloskeletal :      arthralgia, myalgia, back pain  Hematology:              easy bruising, nose or gum bleeding, lymphadenopathy   Dermatological:         rash, ulceration, pruritis, color change / jaundice  Endocrine:                 hot flashes or polydipsia   Neurological:             headache, dizziness, confusion, focal weakness, paresthesia,                                      Speech difficulties, memory loss, gait difficulty  Psychological:          Feelings of anxiety, depression, agitation        Social History     Socioeconomic History    Marital status:    Tobacco Use    Smoking status: Never Smoker    Smokeless tobacco: Never Used   Vaping Use    Vaping Use: Never used   Substance and Sexual Activity    Alcohol use: Yes     Alcohol/week: 1.0 standard drink     Types: 1 Standard drinks or equivalent per week     Comment: occ    Drug use: No    Sexual activity: Yes     Partners: Male     Social Determinants of Health     Financial Resource Strain: Low Risk     Difficulty of Paying Living Expenses: Not hard at all   Food Insecurity: No Food Insecurity    Worried About Running Out of Food in the Last Year: Never true    Pratima of Food in the Last Year: Never true     Family History   Problem Relation Age of Onset    Diabetes Mother     Hypertension Mother     Hypertension Father     Clotting Disorder Father         blood clot in leg after surgery    Thyroid Disease Father     OSTEOARTHRITIS Father     Psychiatric Disorder Sister     Other Maternal Grandfather         abdominal anuerysm    Diabetes Maternal Grandfather         prostate    Diabetes Maternal Grandmother     Diabetes Paternal Grandfather         prostate       OBJECTIVE:     Visit Vitals  /70 (BP 1 Location: Left arm, BP Patient Position: Sitting, BP Cuff Size: Adult)   Pulse 94   Temp 98.5 °F (36.9 °C) (Oral)   Resp 16   Ht 5' 6\" (1.676 m)   Wt 112 lb 3.2 oz (50.9 kg)   LMP 05/30/2022   SpO2 100%   BMI 18.11 kg/m²       Constitutional: She appears well nourished, of stated age, and dressed appropriately. Eyes: Sclera anicteric, PERRLA, EOMI  Neck: Supple without lymphadenopathy. Thyroid normal, No JVD or bruits  Respiratory: Clear to ascultation X5, normal inspiratory effort, no adventitious breath sounds.   Cardiovascular: Regular rate and rhythm, no rubs or gallops, PMI not displaced, No thrills, no peripheral edema  Neuro: Non-focal exam, A & O X 3   Psychiatric: Appropriate affect and demeanor, pleasant and cooperative. Patient's thought content and thought processing appear to be within normal limits. ASSESSMENT/PLAN:     ICD-10-CM ICD-9-CM    1. Anxiety  F41.9 300.00 ALPRAZolam (XANAX) 0.5 mg tablet   2. Stress reaction  F43.0 308.9    3. Other insomnia  G47.09 780.52 zolpidem (AMBIEN) 10 mg tablet   4. Intractable migraine with status migrainosus, unspecified migraine type  G43.911 346.93    5. Panic attack as reaction to stress  F41.0 308.0 ALPRAZolam (XANAX) 0.5 mg tablet    F43.0       1: Patient to continue alprazolam as needed during day for breakthrough anxiety issues. 2: Patient will be started on Ambien 10 mg 1/2-1 whole tablet as needed at night for sleep. 3: Continue Nurtec as prescribed for management of migraine headaches. 4: Encouraged to work on stress reduction techniques. 5: Continue healthy lifestyle management. 6: Patient to follow-up with me in approximately 3 months, or sooner as needed. Patient states understanding and agrees with plan. Orders Placed This Encounter    zolpidem (AMBIEN) 10 mg tablet    ALPRAZolam (XANAX) 0.5 mg tablet         ATTENTION:   This medical record was transcribed using an electronic medical records system. Although proofread, it may and can contain electronic and spelling errors. Other human spelling and other errors may be present. Corrections may be executed at a later time. Please feel free to contact us for any clarifications as needed. Follow-up and Dispositions    · Return in about 3 months (around 8/31/2022) for Follow up. Signed,  Rios Bray.  Liudmila Maldonado, MSN APRN FNP-BC

## 2022-05-31 NOTE — PROGRESS NOTES
Migdalia Pulido is a 39 y.o. female     Chief Complaint   Patient presents with    Anxiety     3M follow up       Visit Vitals  /70 (BP 1 Location: Left arm, BP Patient Position: Sitting, BP Cuff Size: Adult)   Pulse 94   Temp 98.5 °F (36.9 °C) (Oral)   Resp 16   Ht 5' 6\" (1.676 m)   Wt 112 lb 3.2 oz (50.9 kg)   LMP 05/30/2022   SpO2 100%   BMI 18.11 kg/m²       Health Maintenance Due   Topic Date Due    Cervical cancer screen  Never done         1. \"Have you been to the ER, urgent care clinic since your last visit? Hospitalized since your last visit? \" No    2. \"Have you seen or consulted any other health care providers outside of the [address] [place] [address] since your last visit? \" No     3. For patients aged 39-70: Has the patient had a colonoscopy / FIT/ Cologuard? NA - based on age      If the patient is female:    4. For patients aged 41-77: Has the patient had a mammogram within the past 2 years? NA - based on age or sex      11. For patients aged 21-65: Has the patient had a pap smear? Yes - Care Gap present.  Rooming MA/LPN to request most recent results

## 2022-06-08 ENCOUNTER — HOSPITAL ENCOUNTER (OUTPATIENT)
Dept: MRI IMAGING | Age: 37
Discharge: HOME | End: 2022-06-08
Attending: PSYCHIATRY & NEUROLOGY
Payer: COMMERCIAL

## 2022-06-08 VITALS — WEIGHT: 112 LBS | BODY MASS INDEX: 18.08 KG/M2

## 2022-06-08 DIAGNOSIS — R26.81 GAIT INSTABILITY: ICD-10-CM

## 2022-06-08 PROCEDURE — A9575 INJ GADOTERATE MEGLUMI 0.1ML: HCPCS | Performed by: PSYCHIATRY & NEUROLOGY

## 2022-06-08 PROCEDURE — 72156 MRI NECK SPINE W/O & W/DYE: CPT

## 2022-06-08 PROCEDURE — 72148 MRI LUMBAR SPINE W/O DYE: CPT

## 2022-06-08 PROCEDURE — 74011250636 HC RX REV CODE- 250/636: Performed by: PSYCHIATRY & NEUROLOGY

## 2022-06-08 RX ORDER — GADOTERATE MEGLUMINE 376.9 MG/ML
10 INJECTION INTRAVENOUS
Status: COMPLETED | OUTPATIENT
Start: 2022-06-08 | End: 2022-06-08

## 2022-06-08 RX ADMIN — GADOTERATE MEGLUMINE 10 ML: 376.9 INJECTION INTRAVENOUS at 10:40

## 2022-08-17 ENCOUNTER — OFFICE VISIT (OUTPATIENT)
Dept: INTERNAL MEDICINE CLINIC | Age: 37
End: 2022-08-17
Payer: COMMERCIAL

## 2022-08-17 VITALS
DIASTOLIC BLOOD PRESSURE: 70 MMHG | HEART RATE: 70 BPM | WEIGHT: 116.4 LBS | OXYGEN SATURATION: 98 % | TEMPERATURE: 98.3 F | BODY MASS INDEX: 18.71 KG/M2 | HEIGHT: 66 IN | SYSTOLIC BLOOD PRESSURE: 122 MMHG | RESPIRATION RATE: 18 BRPM

## 2022-08-17 DIAGNOSIS — M54.2 NECK PAIN: ICD-10-CM

## 2022-08-17 DIAGNOSIS — R11.0 NAUSEA: Primary | ICD-10-CM

## 2022-08-17 DIAGNOSIS — M62.838 MUSCLE SPASMS OF NECK: ICD-10-CM

## 2022-08-17 PROCEDURE — 99213 OFFICE O/P EST LOW 20 MIN: CPT | Performed by: NURSE PRACTITIONER

## 2022-08-17 RX ORDER — ONDANSETRON 4 MG/1
4 TABLET, ORALLY DISINTEGRATING ORAL
Qty: 15 TABLET | Refills: 0 | Status: SHIPPED | OUTPATIENT
Start: 2022-08-17

## 2022-08-17 RX ORDER — DIAZEPAM 5 MG/1
2.5-5 TABLET ORAL
Qty: 30 TABLET | Refills: 0 | Status: SHIPPED | OUTPATIENT
Start: 2022-08-17 | End: 2022-10-12

## 2022-08-17 RX ORDER — METHYLPREDNISOLONE 4 MG/1
TABLET ORAL
Qty: 1 DOSE PACK | Refills: 0 | Status: SHIPPED | OUTPATIENT
Start: 2022-08-17

## 2022-08-17 NOTE — PROGRESS NOTES
Chief Complaint   Patient presents with    Neck Pain     Pain started over the weekend and has since worsen. SUBJECTIVE:    Jelena Callahan is a 39 y.o. female who presents today with some severe right-sided neck pain which she states started approximately 5 days ago and has gradually worsened since. She has been using anti-inflammatories such as Motrin 800 mg 3 times daily, as well as muscle relaxant which she feels has not been helpful at all. She has tried using a soft cervical collar, but without much relief. She had a recent MRI of her neck and low back that was ordered by neurology. Both of these were essentially normal with no significant abnormalities found. She states her pain was so intense this morning that she actually vomited. She denies any rash or bruising, or any history of trauma to the area. Current Outpatient Medications   Medication Sig Dispense Refill    methylPREDNISolone (MEDROL DOSEPACK) 4 mg tablet Take ALL of DAY 1 tablets upon receipt, then follow directions for remaining days. 1 Dose Pack 0    ondansetron (ZOFRAN ODT) 4 mg disintegrating tablet Take 1 Tablet by mouth every eight (8) hours as needed for Nausea or Vomiting. 15 Tablet 0    diazePAM (VALIUM) 5 mg tablet Take 0.5-1 Tablets by mouth every eight (8) hours as needed for Anxiety. Max Daily Amount: 15 mg. 30 Tablet 0    ALPRAZolam (XANAX) 0.5 mg tablet Take 0.5-1 Tablets by mouth two (2) times daily as needed for Anxiety. Max Daily Amount: 1 mg. Indications: SEVERE ANXIETY 30 Tablet 0    rimegepant (NURTEC) 75 mg disintegrating tablet Take 1 Tablet by mouth every other day. 16 Tablet 2    tiZANidine (ZANAFLEX) 2 mg tablet Take 1 Tablet by mouth three (3) times daily as needed for Muscle Spasm(s). 10 Tablet 1    ibuprofen (MOTRIN) 200 mg tablet Take  by mouth.      promethazine (PHENERGAN) 25 mg tablet Take 1 Tablet by mouth every eight (8) hours as needed for Nausea.  30 Tablet 2    zolpidem (AMBIEN) 10 mg tablet Take 0.5-1 Tablets by mouth nightly as needed for Sleep. Max Daily Amount: 10 mg. Indications: difficulty falling asleep (Patient not taking: Reported on 2022) 30 Tablet 2    hydrOXYzine HCL (ATARAX) 10 mg tablet Take 10 mg by mouth nightly as needed. (Patient not taking: Reported on 2022)      B2-magnesium cit,oxid-feverfew (MigreLief) 200-180-50 mg tab Take 1 Tablet by mouth daily.  (Patient not taking: Reported on 2022) 30 Tablet 3     Past Medical History:   Diagnosis Date    Nausea & vomiting     Raynaud's disease     Rhesus isoimmunization affecting pregnancy     A negative     Past Surgical History:   Procedure Laterality Date    HX BREAST AUGMENTATION Bilateral     HX  SECTION  2013    HX HEENT      wisdom teeth extraction    HX HERNIA REPAIR  2021     No Known Allergies    REVIEW OF SYSTEMS:                                        POSITIVE= bold text  Negative = regular text    General:                     fever, chills, sweats, generalized weakness, weight loss/gain,                                       loss of appetite   Eyes:                           blurred vision, eye pain, loss of vision, double vision  ENT:                            rhinorrhea, pharyngitis   Respiratory:               cough, sputum production, SOB, ERICKSON, wheezing, pleuritic pain   Cardiology:                chest pain, palpitations, orthopnea, PND, edema, syncope   Gastrointestinal:       abdominal pain , N/V, diarrhea, dysphagia, constipation, bleeding   Genitourinary:           frequency, urgency, dysuria, hematuria, incontinence   Muskuloskeletal :      arthralgia, myalgia, back pain, neck pain  Hematology:              easy bruising, nose or gum bleeding, lymphadenopathy   Dermatological:         rash, ulceration, pruritis, color change / jaundice  Endocrine:                 hot flashes or polydipsia   Neurological:             headache, dizziness, confusion, focal weakness, paresthesia,                                      Speech difficulties, memory loss, gait difficulty  Psychological:          Feelings of anxiety, depression, agitation        Social History     Socioeconomic History    Marital status:    Tobacco Use    Smoking status: Never    Smokeless tobacco: Never   Vaping Use    Vaping Use: Never used   Substance and Sexual Activity    Alcohol use: Yes     Alcohol/week: 1.0 standard drink     Types: 1 Standard drinks or equivalent per week     Comment: occ    Drug use: No    Sexual activity: Yes     Partners: Male     Social Determinants of Health     Financial Resource Strain: Low Risk     Difficulty of Paying Living Expenses: Not hard at all   Food Insecurity: No Food Insecurity    Worried About Running Out of Food in the Last Year: Never true    Ran Out of Food in the Last Year: Never true     Family History   Problem Relation Age of Onset    Diabetes Mother     Hypertension Mother     Hypertension Father     Clotting Disorder Father         blood clot in leg after surgery    Thyroid Disease Father     OSTEOARTHRITIS Father     Psychiatric Disorder Sister     Other Maternal Grandfather         abdominal anuerysm    Diabetes Maternal Grandfather         prostate    Diabetes Maternal Grandmother     Diabetes Paternal Grandfather         prostate       OBJECTIVE:     Visit Vitals  /70 (BP 1 Location: Right arm, BP Patient Position: Sitting, BP Cuff Size: Adult)   Pulse 70   Temp 98.3 °F (36.8 °C) (Oral)   Resp 18   Ht 5' 6\" (1.676 m)   Wt 116 lb 6.4 oz (52.8 kg)   SpO2 98%   BMI 18.79 kg/m²       Constitutional: She appears well nourished, of stated age, and dressed appropriately. Eyes: Sclera anicteric, PERRLA, EOMI  Neck: Supple without lymphadenopathy. Thyroid normal, No JVD or bruits  Respiratory: Clear to ascultation X5, normal inspiratory effort, no adventitious breath sounds.   Cardiovascular: Regular rate and rhythm, no murmurs, rubs or gallops, PMI not displaced, No thrills, no peripheral edema  Musculoskeletal: Patient palpably tender over sternocleidomastoid and upper trapezius on palpation to right side of neck. Range of motion decreased due to pain. No swelling, redness, bruising, or rash present. Integumentary: No unusual rashes or suspicious skin lesions noted. Neuro: Non-focal exam, A & O X 3.    ASSESSMENT/PLAN:     ICD-10-CM ICD-9-CM    1. Nausea  R11.0 787.02 ondansetron (ZOFRAN ODT) 4 mg disintegrating tablet      2. Neck pain  M54.2 723.1 methylPREDNISolone (MEDROL DOSEPACK) 4 mg tablet      3. Muscle spasms of neck  M62.838 728.85 diazePAM (VALIUM) 5 mg tablet        1: Patient will be given prescriptions including: Medrol Dosepak, Valium, and Zofran for management of presenting symptoms. 2: Patient given cervical/neck stretches and exercises. 3: Suggested to alternate moist heat and ice to the painful areas. 4: Wear soft cervical collar for added comfort. 5: Follow-up with me in 1 month or sooner as needed. Patient states understanding and agrees with plan. Orders Placed This Encounter    methylPREDNISolone (MEDROL DOSEPACK) 4 mg tablet    ondansetron (ZOFRAN ODT) 4 mg disintegrating tablet    diazePAM (VALIUM) 5 mg tablet         ATTENTION:   This medical record was transcribed using an electronic medical records system. Although proofread, it may and can contain electronic and spelling errors. Other human spelling and other errors may be present. Corrections may be executed at a later time. Please feel free to contact us for any clarifications as needed. Follow-up and Dispositions    Return if symptoms worsen or fail to improve. Signed,  Marybeth Choudhury.  Kat Dunne MSN APRN FNP-BC

## 2022-08-17 NOTE — PROGRESS NOTES
[place] is a 39 y.o. female     Chief Complaint   Patient presents with    Neck Pain     Pain started over the weekend and has since worsen. Visit Vitals  /70 (BP 1 Location: Right arm, BP Patient Position: Sitting, BP Cuff Size: Adult)   Pulse 70   Temp 98.3 °F (36.8 °C) (Oral)   Resp 18   Ht 5' 6\" (1.676 m)   Wt 116 lb 6.4 oz (52.8 kg)   SpO2 98%   BMI 18.79 kg/m²       There are no preventive care reminders to display for this patient. 1. \"Have you been to the ER, urgent care clinic since your last visit? Hospitalized since your last visit? \" No    2. \"Have you seen or consulted any other health care providers outside of the [address] [place] [address] since your last visit? \" No     3. For patients aged 39-70: Has the patient had a colonoscopy / FIT/ Cologuard? NA - based on age      If the patient is female:    4. For patients aged 41-77: Has the patient had a mammogram within the past 2 years? NA - based on age or sex      11. For patients aged 21-65: Has the patient had a pap smear?  Yes - no Care Gap present

## 2022-10-12 ENCOUNTER — OFFICE VISIT (OUTPATIENT)
Dept: INTERNAL MEDICINE CLINIC | Age: 37
End: 2022-10-12
Payer: COMMERCIAL

## 2022-10-12 VITALS
BODY MASS INDEX: 18.61 KG/M2 | DIASTOLIC BLOOD PRESSURE: 70 MMHG | OXYGEN SATURATION: 99 % | RESPIRATION RATE: 16 BRPM | HEIGHT: 66 IN | SYSTOLIC BLOOD PRESSURE: 114 MMHG | TEMPERATURE: 98 F | WEIGHT: 115.8 LBS | HEART RATE: 85 BPM

## 2022-10-12 DIAGNOSIS — F43.0 STRESS REACTION: ICD-10-CM

## 2022-10-12 DIAGNOSIS — F43.0 PANIC ATTACK AS REACTION TO STRESS: ICD-10-CM

## 2022-10-12 DIAGNOSIS — F41.0 PANIC ATTACK AS REACTION TO STRESS: ICD-10-CM

## 2022-10-12 DIAGNOSIS — F41.1 GAD (GENERALIZED ANXIETY DISORDER): Primary | ICD-10-CM

## 2022-10-12 PROBLEM — Z34.90 PREGNANCY: Status: RESOLVED | Noted: 2019-03-07 | Resolved: 2022-10-12

## 2022-10-12 PROCEDURE — 99213 OFFICE O/P EST LOW 20 MIN: CPT | Performed by: NURSE PRACTITIONER

## 2022-10-12 RX ORDER — ALPRAZOLAM 0.5 MG/1
.25-.5 TABLET ORAL
Qty: 30 TABLET | Refills: 0 | Status: SHIPPED | OUTPATIENT
Start: 2022-10-12

## 2022-10-12 RX ORDER — CLONAZEPAM 0.5 MG/1
0.5 TABLET ORAL EVERY MORNING
Qty: 30 TABLET | Refills: 2 | Status: SHIPPED | OUTPATIENT
Start: 2022-10-12

## 2022-10-12 RX ORDER — ESCITALOPRAM OXALATE 10 MG/1
TABLET ORAL
COMMUNITY
End: 2022-10-12

## 2022-10-12 NOTE — PROGRESS NOTES
Chief Complaint   Patient presents with    Anxiety     1M follow up       SUBJECTIVE:    Emilee Courtney is a 44 y.o. female who is here today for a follow up appointment regarding her ongoing anxiety and stress issues. Additionally, the patient also suffers from migraine headaches. She is currently being seen by neurology for this and takes Nurtec which she states has been beneficial.    Historically, the patient has been suffering from anxiety, panic episodes, and stress reaction for quite some time now. She has tried a variety of SSRIs and other medications, but these did not appear to agree with her. At our last encounter, she agreed to try Xanax to use \"as needed\" for breakthrough anxiety and panic attacks. She states she has been using the medication, typically 1-2 times a week, and that it works quite well for her without any significant adverse side effects. However, she states she continues to have an underlying level of stress due to household/parenting obligations, and feels that she is often pushed to a limit in which she has had some anger reactions. She states the Xanax has been helpful to use when getting to this point, but she can tell that she has an underlying amount of stress which simply does not dissipate with current methods. She would like to discuss further options to help manage this. Current Outpatient Medications   Medication Sig Dispense Refill    ALPRAZolam (XANAX) 0.5 mg tablet Take 0.5-1 Tablets by mouth two (2) times daily as needed for Anxiety. Max Daily Amount: 1 mg. Indications: SEVERE ANXIETY 30 Tablet 0    clonazePAM (KlonoPIN) 0.5 mg tablet Take 1 Tablet by mouth Every morning. Max Daily Amount: 0.5 mg. 30 Tablet 2    rimegepant (NURTEC) 75 mg disintegrating tablet Take 1 Tablet by mouth every other day.  16 Tablet 2    ibuprofen (MOTRIN) 200 mg tablet Take  by mouth.      promethazine (PHENERGAN) 25 mg tablet Take 1 Tablet by mouth every eight (8) hours as needed for Nausea. 30 Tablet 2    methylPREDNISolone (MEDROL DOSEPACK) 4 mg tablet Take ALL of DAY 1 tablets upon receipt, then follow directions for remaining days. (Patient not taking: Reported on 10/12/2022) 1 Dose Pack 0    ondansetron (ZOFRAN ODT) 4 mg disintegrating tablet Take 1 Tablet by mouth every eight (8) hours as needed for Nausea or Vomiting. (Patient not taking: Reported on 10/12/2022) 15 Tablet 0    tiZANidine (ZANAFLEX) 2 mg tablet Take 1 Tablet by mouth three (3) times daily as needed for Muscle Spasm(s).  (Patient not taking: Reported on 10/12/2022) 10 Tablet 1     Past Medical History:   Diagnosis Date    Nausea & vomiting     Raynaud's disease     Rhesus isoimmunization affecting pregnancy     A negative     Past Surgical History:   Procedure Laterality Date    HX BREAST AUGMENTATION Bilateral     HX  SECTION  ,     HX HEENT      wisdom teeth extraction    HX HERNIA REPAIR  2021     No Known Allergies    REVIEW OF SYSTEMS:                                        POSITIVE= bold text  Negative = regular text    General:                     fever, chills, sweats, generalized weakness, weight loss/gain,                                       loss of appetite   Eyes:                           blurred vision, eye pain, loss of vision, double vision  ENT:                            rhinorrhea, pharyngitis   Respiratory:               cough, sputum production, SOB, ERICKSON, wheezing, pleuritic pain   Cardiology:                chest pain, palpitations, orthopnea, PND, edema, syncope   Gastrointestinal:       abdominal pain , N/V, diarrhea, dysphagia, constipation, bleeding   Genitourinary:           frequency, urgency, dysuria, hematuria, incontinence   Muskuloskeletal :      arthralgia, myalgia, back pain  Hematology:              easy bruising, nose or gum bleeding, lymphadenopathy   Dermatological:         rash, ulceration, pruritis, color change / jaundice  Endocrine:                 hot flashes or polydipsia   Neurological:             headache, dizziness, confusion, focal weakness, paresthesia,                                      Speech difficulties, memory loss, gait difficulty  Psychological:          Feelings of anxiety, depression, agitation        Social History     Socioeconomic History    Marital status:    Tobacco Use    Smoking status: Never    Smokeless tobacco: Never   Vaping Use    Vaping Use: Never used   Substance and Sexual Activity    Alcohol use: Yes     Alcohol/week: 1.0 standard drink     Types: 1 Standard drinks or equivalent per week     Comment: occ    Drug use: No    Sexual activity: Yes     Partners: Male     Social Determinants of Health     Financial Resource Strain: Low Risk     Difficulty of Paying Living Expenses: Not hard at all   Food Insecurity: No Food Insecurity    Worried About Running Out of Food in the Last Year: Never true    Ran Out of Food in the Last Year: Never true     Family History   Problem Relation Age of Onset    Diabetes Mother     Hypertension Mother     Hypertension Father     Clotting Disorder Father         blood clot in leg after surgery    Thyroid Disease Father     OSTEOARTHRITIS Father     Psychiatric Disorder Sister     Other Maternal Grandfather         abdominal anuerysm    Diabetes Maternal Grandfather         prostate    Diabetes Maternal Grandmother     Diabetes Paternal Grandfather         prostate       OBJECTIVE:     Visit Vitals  /70 (BP 1 Location: Left arm, BP Patient Position: Sitting, BP Cuff Size: Adult)   Pulse 85   Temp 98 °F (36.7 °C) (Oral)   Resp 16   Ht 5' 6\" (1.676 m)   Wt 115 lb 12.8 oz (52.5 kg)   SpO2 99%   BMI 18.69 kg/m²       Constitutional: She appears well nourished, of stated age, and dressed appropriately. Eyes: Sclera anicteric, PERRLA, EOMI  Respiratory: Clear to ascultation X5, normal inspiratory effort, no adventitious breath sounds.   Cardiovascular: Regular rate and rhythm, no murmurs, rubs or gallops, PMI not displaced, No thrills, no peripheral edema  Psychiatric: Appropriate affect and demeanor, pleasant and cooperative. Patient's thought content and thought processing appear to be within normal limits. ASSESSMENT/PLAN:     ICD-10-CM ICD-9-CM    1. JOEY (generalized anxiety disorder)  F41.1 300.02 clonazePAM (KlonoPIN) 0.5 mg tablet      2. Panic attack as reaction to stress  F41.0 308.0 ALPRAZolam (XANAX) 0.5 mg tablet    F43.0        3. Stress reaction  F43.0 308.9         1: After lengthy discussion, we eventually agreed to trial on clonazepam 0.5 mg each morning. This has been selected due to patient's intolerance to traditional SSRIs and other daily medications, and her tolerance of alprazolam.  2: Patient may continue to use alprazolam on an as-needed basis for breakthrough panic episodes/stress reactions. 3: Patient to work on stress reduction techniques such as yoga, meditation, and breathing exercises. 4: Continue all other medications as directed. 5: Patient to follow-up with me in approximately 6 weeks, or sooner as needed. Patient states understanding and agrees with plan. Orders Placed This Encounter    DISCONTD: ubrogepant (UBRELVY PO)    DISCONTD: escitalopram oxalate (Lexapro) 10 mg tablet    ALPRAZolam (XANAX) 0.5 mg tablet    clonazePAM (KlonoPIN) 0.5 mg tablet         ATTENTION:   This medical record was transcribed using an electronic medical records system. Although proofread, it may and can contain electronic and spelling errors. Other human spelling and other errors may be present. Corrections may be executed at a later time. Please feel free to contact us for any clarifications as needed. Follow-up and Dispositions    Return in about 6 weeks (around 11/23/2022) for Follow up. Signed,  Dustin Mccoy.  Felicia Diallo, MSN APRN FNP-BC

## 2022-10-12 NOTE — PROGRESS NOTES
Mich Abreu is a 44 y.o. female     Chief Complaint   Patient presents with    Anxiety     1M follow up       Visit Vitals  /70 (BP 1 Location: Left arm, BP Patient Position: Sitting, BP Cuff Size: Adult)   Pulse 85   Temp 98 °F (36.7 °C) (Oral)   Resp 16   Ht 5' 6\" (1.676 m)   Wt 115 lb 12.8 oz (52.5 kg)   SpO2 99%   BMI 18.69 kg/m²       There are no preventive care reminders to display for this patient. 1. \"Have you been to the ER, urgent care clinic since your last visit? Hospitalized since your last visit? \"  Seen at [facility] for a burn on her arm    2. \"Have you seen or consulted any other health care providers outside of the [address] [place] [address] since your last visit? \" No     3. For patients aged 39-70: Has the patient had a colonoscopy / FIT/ Cologuard? NA - based on age      If the patient is female:    4. For patients aged 41-77: Has the patient had a mammogram within the past 2 years? NA - based on age or sex      11. For patients aged 21-65: Has the patient had a pap smear?  Yes - no Care Gap present

## 2022-12-15 ENCOUNTER — OFFICE VISIT (OUTPATIENT)
Dept: INTERNAL MEDICINE CLINIC | Age: 37
End: 2022-12-15
Payer: COMMERCIAL

## 2022-12-15 VITALS
BODY MASS INDEX: 18.61 KG/M2 | HEART RATE: 81 BPM | TEMPERATURE: 98.7 F | SYSTOLIC BLOOD PRESSURE: 116 MMHG | WEIGHT: 115.8 LBS | OXYGEN SATURATION: 98 % | RESPIRATION RATE: 16 BRPM | HEIGHT: 66 IN | DIASTOLIC BLOOD PRESSURE: 68 MMHG

## 2022-12-15 DIAGNOSIS — F43.0 PANIC ATTACK AS REACTION TO STRESS: ICD-10-CM

## 2022-12-15 DIAGNOSIS — F43.0 STRESS REACTION: ICD-10-CM

## 2022-12-15 DIAGNOSIS — F41.0 PANIC ATTACK AS REACTION TO STRESS: ICD-10-CM

## 2022-12-15 DIAGNOSIS — Z86.39 HISTORY OF ELEVATED LIPIDS: ICD-10-CM

## 2022-12-15 DIAGNOSIS — F41.1 GAD (GENERALIZED ANXIETY DISORDER): Primary | ICD-10-CM

## 2022-12-15 DIAGNOSIS — Z78.9 EXCESSIVE CAFFEINE INTAKE: ICD-10-CM

## 2022-12-15 DIAGNOSIS — R25.1 TREMOR OF BOTH HANDS: ICD-10-CM

## 2022-12-15 DIAGNOSIS — M62.89 PERIPHERAL MUSCLE FATIGUE: ICD-10-CM

## 2022-12-15 PROCEDURE — 99214 OFFICE O/P EST MOD 30 MIN: CPT | Performed by: NURSE PRACTITIONER

## 2022-12-15 RX ORDER — ALPRAZOLAM 0.5 MG/1
.25-.5 TABLET ORAL
Qty: 30 TABLET | Refills: 0 | Status: SHIPPED | OUTPATIENT
Start: 2022-12-15

## 2022-12-15 NOTE — PROGRESS NOTES
Chief Complaint   Patient presents with    Anxiety     6wk       SUBJECTIVE:    Alexandre Irving is a 44 y.o. female who is here today for a follow up appointment regarding current conditions including: Generalized anxiety disorder with panic episodes and stress reaction. She also continues to have tremors of both her hands with easy fatigue of muscles to upper extremities. At her previous encounter, the patient was given clonazepam 0.5 mg to take daily to help with her anxiety attacks. She had previously been taking alprazolam on an as-needed basis. My thoughts were to utilize something which would be longer lasting, as the patient stated that she was unable to tolerate traditional SSRIs or similar medications that she had used in the past.  Unfortunately, she states the clonazepam was a bit too fatiguing for her to use. She would like to go back to just using the alprazolam on an as-needed basis. Admittedly, she does drink a fair amount of caffeine via tea. She also states skips breakfast on most days, and eats a late lunch. She states her stature has always been thin, and has never had a weight problem. She has noticed continued tremors for quite some time now. She had previously been seen by neurology, and had an MRI done on 01/18/2022 which was negative for any intracranial lesion or abnormality. However, she admits to having severe shaking to her arms and hands especially after periods of excessive muscle use to these areas. She also admits to possibly \"not drinking enough water,\" and admits to possible inadequate caloric intake on most days. Current Outpatient Medications   Medication Sig Dispense Refill    ALPRAZolam (XANAX) 0.5 mg tablet Take 0.5-1 Tablets by mouth two (2) times daily as needed for Anxiety. Max Daily Amount: 1 mg. Indications: SEVERE ANXIETY 30 Tablet 0    rimegepant (NURTEC) 75 mg disintegrating tablet Take 1 Tablet by mouth every other day.  16 Tablet 2 ibuprofen (MOTRIN) 200 mg tablet Take  by mouth.      promethazine (PHENERGAN) 25 mg tablet Take 1 Tablet by mouth every eight (8) hours as needed for Nausea. 30 Tablet 2    clonazePAM (KlonoPIN) 0.5 mg tablet Take 1 Tablet by mouth Every morning. Max Daily Amount: 0.5 mg. (Patient not taking: Reported on 12/15/2022) 30 Tablet 2    methylPREDNISolone (MEDROL DOSEPACK) 4 mg tablet Take ALL of DAY 1 tablets upon receipt, then follow directions for remaining days. (Patient not taking: No sig reported) 1 Dose Pack 0    ondansetron (ZOFRAN ODT) 4 mg disintegrating tablet Take 1 Tablet by mouth every eight (8) hours as needed for Nausea or Vomiting. (Patient not taking: No sig reported) 15 Tablet 0    tiZANidine (ZANAFLEX) 2 mg tablet Take 1 Tablet by mouth three (3) times daily as needed for Muscle Spasm(s).  (Patient not taking: No sig reported) 10 Tablet 1     Past Medical History:   Diagnosis Date    JOEY (generalized anxiety disorder) 12/15/2022    Nausea & vomiting     Raynaud's disease     Rhesus isoimmunization affecting pregnancy     A negative     Past Surgical History:   Procedure Laterality Date    HX BREAST AUGMENTATION Bilateral     HX  SECTION  ,     HX HEENT      wisdom teeth extraction    HX HERNIA REPAIR  2021     No Known Allergies    REVIEW OF SYSTEMS:                                        POSITIVE= bold text  Negative = regular text    General:                     fever, chills, sweats, generalized weakness, weight loss/gain,                                       loss of appetite   Eyes:                           blurred vision, eye pain, loss of vision, double vision  ENT:                            rhinorrhea, pharyngitis   Respiratory:               cough, sputum production, SOB, ERICKSON, wheezing, pleuritic pain   Cardiology:                chest pain, palpitations, orthopnea, PND, edema, syncope   Gastrointestinal:       abdominal pain , N/V, diarrhea, dysphagia, constipation, bleeding   Genitourinary:           frequency, urgency, dysuria, hematuria, incontinence   Muskuloskeletal :      arthralgia, myalgia, back pain  Hematology:              easy bruising, nose or gum bleeding, lymphadenopathy   Dermatological:         rash, ulceration, pruritis, color change / jaundice  Endocrine:                 hot flashes or polydipsia   Neurological:             headache, dizziness, confusion, focal weakness, paresthesia,                                      Speech difficulties, memory loss, gait difficulty, tremors  Psychological:          Feelings of anxiety, depression, agitation        Social History     Socioeconomic History    Marital status:    Tobacco Use    Smoking status: Never    Smokeless tobacco: Never   Vaping Use    Vaping Use: Never used   Substance and Sexual Activity    Alcohol use:  Yes     Alcohol/week: 1.0 standard drink     Types: 1 Standard drinks or equivalent per week     Comment: occ    Drug use: No    Sexual activity: Yes     Partners: Male     Social Determinants of Health     Financial Resource Strain: Low Risk     Difficulty of Paying Living Expenses: Not hard at all   Food Insecurity: No Food Insecurity    Worried About Running Out of Food in the Last Year: Never true    Ran Out of Food in the Last Year: Never true     Family History   Problem Relation Age of Onset    Diabetes Mother     Hypertension Mother     Hypertension Father     Clotting Disorder Father         blood clot in leg after surgery    Thyroid Disease Father     OSTEOARTHRITIS Father     Psychiatric Disorder Sister     Other Maternal Grandfather         abdominal anuerysm    Diabetes Maternal Grandfather         prostate    Diabetes Maternal Grandmother     Diabetes Paternal Grandfather         prostate       OBJECTIVE:     Visit Vitals  /68 (BP 1 Location: Left upper arm, BP Patient Position: Sitting, BP Cuff Size: Adult)   Pulse 81   Temp 98.7 °F (37.1 °C) (Oral)   Resp 16   Ht 5' 6\" (1.676 m)   Wt 115 lb 12.8 oz (52.5 kg)   SpO2 98%   BMI 18.69 kg/m²       Constitutional: She appears well nourished, of stated age, and dressed appropriately. Eyes: Sclera anicteric, PERRLA, EOMI  Neck: Supple without lymphadenopathy. Thyroid normal, No JVD or bruits  Respiratory: Clear to ascultation X5, normal inspiratory effort, no adventitious breath sounds. Cardiovascular: Regular rate and rhythm, no murmurs, rubs or gallops, PMI not displaced, No thrills, no peripheral edema  Neuro: Non-focal exam, A & O X 3. No hyperflexion of DTRs. Tremors made worse by increased muscular exertion/resistance testing. Psychiatric: Appropriate affect and demeanor, pleasant and cooperative. Patient's thought content and thought processing appear to be within normal limits. ASSESSMENT/PLAN:     ICD-10-CM ICD-9-CM    1. JOEY (generalized anxiety disorder)  F41.1 300.02       2. Panic attack as reaction to stress  F41.0 308.0 ALPRAZolam (XANAX) 0.5 mg tablet    F43.0        3. Stress reaction  F43.0 308.9       4. Tremor of both hands  U25.9 474.1 METABOLIC PANEL, COMPREHENSIVE      URINALYSIS W/ RFLX MICROSCOPIC      5. Peripheral muscle fatigue  Z49.26 531.35 METABOLIC PANEL, COMPREHENSIVE      6. History of elevated lipids  Z86.39 V12.29 LIPID PANEL      7. Excessive caffeine intake  Z78.9 V49.89         1: Patient to discontinue clonazepam due to undesired fatigue and side effects. 2: Patient may continue alprazolam as needed for breakthrough anxiety. 3: Patient to return in 1 to 2 weeks for labs including: Lipid panel, CMP, and urinalysis. 4: Patient advised to limit caffeine intake. Drink plenty of water. 5: I have advised patient to work on appropriate dietary intake. Eat small meals 4-5 times a day to maintain caloric intake. 6: Patient to work on stress reduction techniques such as meditation and yoga. 7: Patient to follow-up with me in approximately 4 months, or sooner as needed.   Patient states understanding and agrees with plan. Orders Placed This Encounter    METABOLIC PANEL, COMPREHENSIVE    LIPID PANEL    URINALYSIS W/ RFLX MICROSCOPIC    ALPRAZolam (XANAX) 0.5 mg tablet         ATTENTION:   This medical record was transcribed using an electronic medical records system. Although proofread, it may and can contain electronic and spelling errors. Other human spelling and other errors may be present. Corrections may be executed at a later time. Please feel free to contact us for any clarifications as needed. Follow-up and Dispositions    Return in about 4 months (around 4/15/2023) for Follow up . Signed,  Rayma Show.  Ronnie Morgan, MSN APRN FNP-BC

## 2022-12-15 NOTE — PROGRESS NOTES
Mich Abreu is a 44 y.o. female     Chief Complaint   Patient presents with    Anxiety     6wk       Visit Vitals  /68 (BP 1 Location: Left upper arm, BP Patient Position: Sitting, BP Cuff Size: Adult)   Pulse 81   Temp 98.7 °F (37.1 °C) (Oral)   Resp 16   Ht 5' 6\" (1.676 m)   Wt 115 lb 12.8 oz (52.5 kg)   SpO2 98%   BMI 18.69 kg/m²       Health Maintenance Due   Topic Date Due    COVID-19 Vaccine (4 - Booster for Moderna series) 01/17/2022         1. \"Have you been to the ER, urgent care clinic since your last visit? Hospitalized since your last visit? \" No    2. \"Have you seen or consulted any other health care providers outside of the [address] [place] [address] since your last visit? \" No     3. For patients aged 39-70: Has the patient had a colonoscopy / FIT/ Cologuard? NA - based on age      If the patient is female:    4. For patients aged 41-77: Has the patient had a mammogram within the past 2 years? NA - based on age or sex      11. For patients aged 21-65: Has the patient had a pap smear?  Yes - no Care Gap present

## 2022-12-20 ENCOUNTER — LAB ONLY (OUTPATIENT)
Dept: INTERNAL MEDICINE CLINIC | Age: 37
End: 2022-12-20

## 2022-12-20 DIAGNOSIS — M62.89 PERIPHERAL MUSCLE FATIGUE: ICD-10-CM

## 2022-12-20 DIAGNOSIS — Z86.39 HISTORY OF ELEVATED LIPIDS: ICD-10-CM

## 2022-12-20 DIAGNOSIS — R25.1 TREMOR OF BOTH HANDS: ICD-10-CM

## 2022-12-21 LAB
ALBUMIN SERPL-MCNC: 4.8 G/DL (ref 3.5–5)
ALBUMIN/GLOB SERPL: 1.5 {RATIO} (ref 1.1–2.2)
ALP SERPL-CCNC: 46 U/L (ref 45–117)
ALT SERPL-CCNC: 19 U/L (ref 12–78)
ANION GAP SERPL CALC-SCNC: 7 MMOL/L (ref 5–15)
APPEARANCE UR: CLEAR
AST SERPL-CCNC: 15 U/L (ref 15–37)
BACTERIA URNS QL MICRO: NEGATIVE /HPF
BILIRUB SERPL-MCNC: 1.1 MG/DL (ref 0.2–1)
BILIRUB UR QL: NEGATIVE
BUN SERPL-MCNC: 12 MG/DL (ref 6–20)
BUN/CREAT SERPL: 16 (ref 12–20)
CALCIUM SERPL-MCNC: 10 MG/DL (ref 8.5–10.1)
CHLORIDE SERPL-SCNC: 106 MMOL/L (ref 97–108)
CHOLEST SERPL-MCNC: 215 MG/DL
CO2 SERPL-SCNC: 26 MMOL/L (ref 21–32)
COLOR UR: ABNORMAL
CREAT SERPL-MCNC: 0.75 MG/DL (ref 0.55–1.02)
EPITH CASTS URNS QL MICRO: ABNORMAL /LPF
GLOBULIN SER CALC-MCNC: 3.1 G/DL (ref 2–4)
GLUCOSE SERPL-MCNC: 95 MG/DL (ref 65–100)
GLUCOSE UR STRIP.AUTO-MCNC: NEGATIVE MG/DL
HDLC SERPL-MCNC: 68 MG/DL
HDLC SERPL: 3.2 {RATIO} (ref 0–5)
HGB UR QL STRIP: ABNORMAL
HYALINE CASTS URNS QL MICRO: ABNORMAL /LPF (ref 0–5)
KETONES UR QL STRIP.AUTO: NEGATIVE MG/DL
LDLC SERPL CALC-MCNC: 127.8 MG/DL (ref 0–100)
LEUKOCYTE ESTERASE UR QL STRIP.AUTO: NEGATIVE
NITRITE UR QL STRIP.AUTO: NEGATIVE
PH UR STRIP: 5.5 [PH] (ref 5–8)
POTASSIUM SERPL-SCNC: 4.1 MMOL/L (ref 3.5–5.1)
PROT SERPL-MCNC: 7.9 G/DL (ref 6.4–8.2)
PROT UR STRIP-MCNC: NEGATIVE MG/DL
RBC #/AREA URNS HPF: ABNORMAL /HPF (ref 0–5)
SODIUM SERPL-SCNC: 139 MMOL/L (ref 136–145)
SP GR UR REFRACTOMETRY: 1.02 (ref 1–1.03)
TRIGL SERPL-MCNC: 96 MG/DL (ref ?–150)
UROBILINOGEN UR QL STRIP.AUTO: 0.2 EU/DL (ref 0.2–1)
VLDLC SERPL CALC-MCNC: 19.2 MG/DL
WBC URNS QL MICRO: ABNORMAL /HPF (ref 0–4)

## 2022-12-21 NOTE — PROGRESS NOTES
Urinalysis is unremarkable. Cholesterol levels are borderline elevated. Please focus on low-fat/low-cholesterol diet. Metabolic panel is unremarkable.

## 2023-02-03 ENCOUNTER — TELEPHONE (OUTPATIENT)
Dept: INTERNAL MEDICINE CLINIC | Age: 38
End: 2023-02-03

## 2023-02-03 DIAGNOSIS — S16.1XXA STRAIN OF NECK MUSCLE, INITIAL ENCOUNTER: Primary | ICD-10-CM

## 2023-02-03 NOTE — TELEPHONE ENCOUNTER
Pt has pulled a muscle in her neck and is requesting a steroid pack be sent in to the pharmacy on file. She states this is a common occurrence and she typically gets a steroid pack when it happens. Please advise.

## 2023-02-06 RX ORDER — PREDNISONE 10 MG/1
10 TABLET ORAL SEE ADMIN INSTRUCTIONS
Qty: 21 TABLET | Refills: 0 | Status: SHIPPED | OUTPATIENT
Start: 2023-02-06

## 2023-02-24 ENCOUNTER — TELEPHONE (OUTPATIENT)
Dept: NEUROLOGY | Age: 38
End: 2023-02-24

## 2023-02-24 NOTE — TELEPHONE ENCOUNTER
Requested Prescriptions     Pending Prescriptions Disp Refills    rimegepant (NURTEC) 75 mg disintegrating tablet 16 Tablet 2     Sig: Take 1 Tablet by mouth every other day.      Last fill 5/21/22    Last visit 5/13/22 VV Dr Valery Tam    Next visit [facility] NP 5/4/23

## 2023-03-17 ENCOUNTER — TELEPHONE (OUTPATIENT)
Dept: INTERNAL MEDICINE CLINIC | Age: 38
End: 2023-03-17

## 2023-03-17 NOTE — TELEPHONE ENCOUNTER
----- Message from [facility] sent at 3/16/2023  4:39 PM EDT -----  Subject: Message to Provider    QUESTIONS  Information for Provider? Pt has waited to see a NT for her dizziness and   back pain. She needs to have a call back and wants to see if she can get a   lab test for a uti.   ---------------------------------------------------------------------------  --------------  Guero PORTILLO  [phone]; OK to leave message on voicemail  ---------------------------------------------------------------------------  --------------  SCRIPT ANSWERS  Relationship to Patient?  Self

## 2023-04-17 ENCOUNTER — OFFICE VISIT (OUTPATIENT)
Dept: INTERNAL MEDICINE CLINIC | Age: 38
End: 2023-04-17
Payer: COMMERCIAL

## 2023-04-17 VITALS
HEART RATE: 79 BPM | DIASTOLIC BLOOD PRESSURE: 72 MMHG | OXYGEN SATURATION: 99 % | TEMPERATURE: 98.5 F | RESPIRATION RATE: 16 BRPM | BODY MASS INDEX: 18.84 KG/M2 | SYSTOLIC BLOOD PRESSURE: 114 MMHG | HEIGHT: 66 IN | WEIGHT: 117.2 LBS

## 2023-04-17 DIAGNOSIS — E78.00 PURE HYPERCHOLESTEROLEMIA: ICD-10-CM

## 2023-04-17 DIAGNOSIS — Z00.00 ROUTINE PHYSICAL EXAMINATION: Primary | ICD-10-CM

## 2023-04-17 DIAGNOSIS — R05.8 PRODUCTIVE COUGH: ICD-10-CM

## 2023-04-17 DIAGNOSIS — J20.9 ACUTE BRONCHITIS, UNSPECIFIED ORGANISM: ICD-10-CM

## 2023-04-17 DIAGNOSIS — Z82.49 FAMILY HISTORY OF CORONARY ARTERY DISEASE: ICD-10-CM

## 2023-04-17 DIAGNOSIS — F41.1 GAD (GENERALIZED ANXIETY DISORDER): ICD-10-CM

## 2023-04-17 PROCEDURE — 99214 OFFICE O/P EST MOD 30 MIN: CPT | Performed by: NURSE PRACTITIONER

## 2023-04-17 RX ORDER — CEFUROXIME AXETIL 500 MG/1
500 TABLET ORAL 2 TIMES DAILY
Qty: 20 TABLET | Refills: 0 | Status: SHIPPED | OUTPATIENT
Start: 2023-04-17

## 2023-04-17 RX ORDER — METHYLPREDNISOLONE 4 MG/1
TABLET ORAL
Qty: 1 DOSE PACK | Refills: 0 | Status: SHIPPED | OUTPATIENT
Start: 2023-04-17

## 2023-04-17 NOTE — PROGRESS NOTES
Aiyana Ho is a 44 y.o. female     Chief Complaint   Patient presents with    Complete Physical       Visit Vitals  /72 (BP 1 Location: Left upper arm, BP Patient Position: Sitting, BP Cuff Size: Adult)   Pulse 79   Temp 98.5 °F (36.9 °C) (Oral)   Resp 16   Ht 5' 6\" (1.676 m)   Wt 117 lb 3.2 oz (53.2 kg)   LMP 03/27/2023   SpO2 99%   BMI 18.92 kg/m²       Health Maintenance Due   Topic Date Due    Varicella Vaccine (1 of 2 - 2-dose childhood series) Never done    COVID-19 Vaccine (4 - Booster for Moderna series) 01/17/2022         1. \"Have you been to the ER, urgent care clinic since your last visit? Hospitalized since your last visit? \" No    2. \"Have you seen or consulted any other health care providers outside of the [address] [place] [address] since your last visit? \" No     3. For patients aged 39-70: Has the patient had a colonoscopy / FIT/ Cologuard? NA - based on age      If the patient is female:    4. For patients aged 41-77: Has the patient had a mammogram within the past 2 years? NA - based on age or sex      11. For patients aged 21-65: Has the patient had a pap smear?  Yes - no Care Gap present

## 2023-04-17 NOTE — PROGRESS NOTES
Chief Complaint   Patient presents with    Complete Physical       SUBJECTIVE:    Sun Allen is a 44 y.o. female who is here today for a routine physical examination as well as follow up appointment regarding history of pure hypercholesterolemia and generalized anxiety disorder. She also presents today with recent episode of sore throat, sinus congestion, productive cough, and occasional shortness of breath which started about a week ago. Patient was last seen in December for evaluation of her cholesterol. Her total cholesterol and LDL levels remain elevated above normal.  She is concerned about this due to her family history of coronary artery disease and cardiac related issues. She states she is trying to be careful and watchful of her diet overall and avoids over-indulgence of high cholesterol products in her diet. She denies any recent episodes of chest pain, chest pressure, palpitations, or syncope episodes. The patient also continues to use alprazolam on an as-needed basis for severe anxiety attacks. She states she is tolerating this well and is using as directed. Additionally, she also complains of symptoms that started out either being an allergy, or cold, and has progressed into a productive cough with some slight shortness of breath over the past week or so. She states she has been using some over-the-counter remedies, but with little to no efficacy. She would like this evaluated further today. She denies any fever or hemoptysis. She states she is occasionally coughing up thick yellow sputum. She has been using Zyrtec on occasion. Current Outpatient Medications   Medication Sig Dispense Refill    cefUROXime (CEFTIN) 500 mg tablet Take 1 Tablet by mouth two (2) times a day. 20 Tablet 0    methylPREDNISolone (MEDROL DOSEPACK) 4 mg tablet Per package instructions. 1 Dose Pack 0    rimegepant (NURTEC) 75 mg disintegrating tablet Take 1 Tablet by mouth every other day.  16 Tablet 2 ALPRAZolam (XANAX) 0.5 mg tablet Take 0.5-1 Tablets by mouth two (2) times daily as needed for Anxiety. Max Daily Amount: 1 mg. Indications: SEVERE ANXIETY 30 Tablet 0    ibuprofen (MOTRIN) 200 mg tablet Take  by mouth.      promethazine (PHENERGAN) 25 mg tablet Take 1 Tablet by mouth every eight (8) hours as needed for Nausea.  30 Tablet 2     Past Medical History:   Diagnosis Date    JOEY (generalized anxiety disorder) 12/15/2022    Nausea & vomiting     Raynaud's disease     Rhesus isoimmunization affecting pregnancy     A negative     Past Surgical History:   Procedure Laterality Date    HX BREAST AUGMENTATION Bilateral     HX  SECTION  ,     HX HEENT      wisdom teeth extraction    HX HERNIA REPAIR  2021     No Known Allergies    REVIEW OF SYSTEMS:                                        POSITIVE= bold text  Negative = regular text    General:                     fever, chills, sweats, generalized weakness, weight loss/gain,                                       loss of appetite   Eyes:                           blurred vision, eye pain, loss of vision, double vision  ENT:                            rhinorrhea, pharyngitis   Respiratory:               cough, sputum production, SOB, ERICKSON, wheezing, pleuritic pain   Cardiology:                chest pain, palpitations, orthopnea, PND, edema, syncope   Gastrointestinal:       abdominal pain , N/V, diarrhea, dysphagia, constipation, bleeding   Genitourinary:           frequency, urgency, dysuria, hematuria, incontinence   Muskuloskeletal :      arthralgia, myalgia, back pain  Hematology:              easy bruising, nose or gum bleeding, lymphadenopathy   Dermatological:         rash, ulceration, pruritis, color change / jaundice  Endocrine:                 hot flashes or polydipsia   Neurological:             headache, dizziness, confusion, focal weakness, paresthesia,                                      Speech difficulties, memory loss, gait difficulty  Psychological:          Feelings of anxiety, depression, agitation        Social History     Socioeconomic History    Marital status:    Tobacco Use    Smoking status: Never    Smokeless tobacco: Never   Vaping Use    Vaping Use: Never used   Substance and Sexual Activity    Alcohol use: Yes     Alcohol/week: 1.0 standard drink     Types: 1 Standard drinks or equivalent per week     Comment: occ    Drug use: No    Sexual activity: Yes     Partners: Male     Social Determinants of Health     Financial Resource Strain: Low Risk     Difficulty of Paying Living Expenses: Not hard at all   Food Insecurity: No Food Insecurity    Worried About Running Out of Food in the Last Year: Never true    Ran Out of Food in the Last Year: Never true     Family History   Problem Relation Age of Onset    Diabetes Mother     Hypertension Mother     Hypertension Father     Clotting Disorder Father         blood clot in leg after surgery    Thyroid Disease Father     OSTEOARTHRITIS Father     Psychiatric Disorder Sister     Other Maternal Grandfather         abdominal anuerysm    Diabetes Maternal Grandfather         prostate    Diabetes Maternal Grandmother     Diabetes Paternal Grandfather         prostate       OBJECTIVE:     Visit Vitals  /72 (BP 1 Location: Left upper arm, BP Patient Position: Sitting, BP Cuff Size: Adult)   Pulse 79   Temp 98.5 °F (36.9 °C) (Oral)   Resp 16   Ht 5' 6\" (1.676 m)   Wt 117 lb 3.2 oz (53.2 kg)   LMP 03/27/2023   SpO2 99%   BMI 18.92 kg/m²       Constitutional: She appears well nourished, of stated age, and dressed appropriately. Eyes: Sclera anicteric, PERRLA, EOMI  ENT: Nares clear, boggy turbinates bilaterally with cobblestoned oropharynx. Air/Fluid level noted behind TMs bilaterally. Neck: Supple without lymphadenopathy. Thyroid normal, No JVD or bruits  Respiratory: Clear to ascultation X5, normal inspiratory effort, no adventitious breath sounds.   Cardiovascular: Regular rate and rhythm, no murmurs, rubs or gallops, PMI not displaced, No thrills, no peripheral edema  Gastrointestinal: Abdomen non-distended, soft, non-tender, bowel sounds normal and active X4. Hematologic: No purpura, petechiae or unexplained bruising  Lymphatic: No lymph node enlargemant. Integumentary: No unusual rashes or suspicious skin lesions noted. Neuro: Non-focal exam, A & O X 3.   Psychiatric: Appropriate affect and demeanor, pleasant and cooperative. Patient's thought content and thought processing appear to be within normal limits. Controlled Substance Monitoring:    RX Monitoring 4/17/2023   Periodic Controlled Substance Monitoring Possible medication side effects, risk of tolerance/dependence & alternative treatments discussed. ;No signs of potential drug abuse or diversion identified. ;Assessed functional status. ASSESSMENT/PLAN:     ICD-10-CM ICD-9-CM    1. Routine physical examination  H18.19 C68.8 METABOLIC PANEL, COMPREHENSIVE      TSH 3RD GENERATION      URINALYSIS W/ RFLX MICROSCOPIC      CBC W/O DIFF      2. Pure hypercholesterolemia  C77.68 238.0 METABOLIC PANEL, COMPREHENSIVE      LIPID PANEL      TSH 3RD GENERATION      3. Acute bronchitis, unspecified organism  J20.9 466.0 cefUROXime (CEFTIN) 500 mg tablet      methylPREDNISolone (MEDROL DOSEPACK) 4 mg tablet      4. Productive cough  R05.8 786. 2 cefUROXime (CEFTIN) 500 mg tablet      methylPREDNISolone (MEDROL DOSEPACK) 4 mg tablet      5. Family history of coronary artery disease  Z82.49 V17.3         1: Patient to return in 1 to 2 weeks fasting for labs including: CBC, CMP, lipid panel, TSH, and urinalysis. 2: Patient counseled on dietary avoidance of high cholesterol meats, processed meat, and high-fat foods. Encouraged healthy dietary fiber and regular exercise patterns. 3: Patient was given Medrol Dosepak and Ceftin twice daily to take for symptoms of acute bronchitis.   May use over-the-counter Mucinex DM due to productive cough. May use NyQuil at night to help with sleep. 4: Continue all other medications as directed. Continue alprazolam as needed for anxiety. 5: Patient follow-up with me in approximately 6 months, or sooner as needed. Patient states understanding agrees to plan. Orders Placed This Encounter    cefUROXime (CEFTIN) 500 mg tablet    methylPREDNISolone (MEDROL DOSEPACK) 4 mg tablet         ATTENTION:   This medical record was transcribed using an electronic medical records system. Although proofread, it may and can contain electronic and spelling errors. Other human spelling and other errors may be present. Corrections may be executed at a later time. Please feel free to contact us for any clarifications as needed. Follow-up and Dispositions    Return in about 6 months (around 10/17/2023) for Follow up, fasting labs. Signed,  Rad Ernst.  Rosiland Ormond, MSN APRN FNP-BC

## 2023-05-04 ENCOUNTER — OFFICE VISIT (OUTPATIENT)
Dept: NEUROLOGY | Age: 38
End: 2023-05-04

## 2023-05-04 VITALS
HEIGHT: 66 IN | WEIGHT: 116.8 LBS | HEART RATE: 83 BPM | SYSTOLIC BLOOD PRESSURE: 108 MMHG | BODY MASS INDEX: 18.77 KG/M2 | TEMPERATURE: 97.6 F | OXYGEN SATURATION: 100 % | RESPIRATION RATE: 18 BRPM | DIASTOLIC BLOOD PRESSURE: 70 MMHG

## 2023-05-04 DIAGNOSIS — R25.1 TREMOR OF BOTH HANDS: ICD-10-CM

## 2023-05-04 DIAGNOSIS — G43.109 MIGRAINE WITH AURA AND WITHOUT STATUS MIGRAINOSUS, NOT INTRACTABLE: Primary | ICD-10-CM

## 2023-05-04 DIAGNOSIS — G47.09 OTHER INSOMNIA: ICD-10-CM

## 2023-05-04 DIAGNOSIS — F41.9 ANXIETY: ICD-10-CM

## 2023-05-04 RX ORDER — PROPRANOLOL HYDROCHLORIDE 20 MG/1
20 TABLET ORAL DAILY
Qty: 30 TABLET | Refills: 3 | Status: SHIPPED | OUTPATIENT
Start: 2023-05-04

## 2023-10-12 ENCOUNTER — TRANSCRIBE ORDERS (OUTPATIENT)
Facility: HOSPITAL | Age: 38
End: 2023-10-12

## 2023-10-12 ENCOUNTER — HOSPITAL ENCOUNTER (OUTPATIENT)
Facility: HOSPITAL | Age: 38
Discharge: HOME | End: 2023-10-12
Payer: COMMERCIAL

## 2023-10-12 PROCEDURE — 74018 RADEX ABDOMEN 1 VIEW: CPT

## 2023-10-13 ENCOUNTER — HOSPITAL ENCOUNTER (OUTPATIENT)
Facility: HOSPITAL | Age: 38
Discharge: HOME | End: 2023-10-13
Payer: COMMERCIAL

## 2023-10-13 ENCOUNTER — TRANSCRIBE ORDERS (OUTPATIENT)
Facility: HOSPITAL | Age: 38
End: 2023-10-13

## 2023-10-13 DIAGNOSIS — M25.559 HIP PAIN, UNSPECIFIED LATERALITY: ICD-10-CM

## 2023-10-13 DIAGNOSIS — M25.559 HIP PAIN, UNSPECIFIED LATERALITY: Primary | ICD-10-CM

## 2023-10-13 PROCEDURE — 72170 X-RAY EXAM OF PELVIS: CPT

## 2023-10-17 SDOH — ECONOMIC STABILITY: FOOD INSECURITY: WITHIN THE PAST 12 MONTHS, THE FOOD YOU BOUGHT JUST DIDN'T LAST AND YOU DIDN'T HAVE MONEY TO GET MORE.: NEVER TRUE

## 2023-10-17 SDOH — ECONOMIC STABILITY: INCOME INSECURITY: HOW HARD IS IT FOR YOU TO PAY FOR THE VERY BASICS LIKE FOOD, HOUSING, MEDICAL CARE, AND HEATING?: NOT HARD AT ALL

## 2023-10-17 SDOH — ECONOMIC STABILITY: FOOD INSECURITY: WITHIN THE PAST 12 MONTHS, YOU WORRIED THAT YOUR FOOD WOULD RUN OUT BEFORE YOU GOT MONEY TO BUY MORE.: NEVER TRUE

## 2023-10-17 SDOH — ECONOMIC STABILITY: HOUSING INSECURITY
IN THE LAST 12 MONTHS, WAS THERE A TIME WHEN YOU DID NOT HAVE A STEADY PLACE TO SLEEP OR SLEPT IN A SHELTER (INCLUDING NOW)?: NO

## 2023-10-17 SDOH — ECONOMIC STABILITY: TRANSPORTATION INSECURITY
IN THE PAST 12 MONTHS, HAS LACK OF TRANSPORTATION KEPT YOU FROM MEETINGS, WORK, OR FROM GETTING THINGS NEEDED FOR DAILY LIVING?: NO

## 2023-10-18 ENCOUNTER — OFFICE VISIT (OUTPATIENT)
Facility: CLINIC | Age: 38
End: 2023-10-18
Payer: COMMERCIAL

## 2023-10-18 VITALS
HEART RATE: 78 BPM | SYSTOLIC BLOOD PRESSURE: 118 MMHG | OXYGEN SATURATION: 99 % | TEMPERATURE: 98.3 F | HEIGHT: 66 IN | DIASTOLIC BLOOD PRESSURE: 76 MMHG | WEIGHT: 117.4 LBS | BODY MASS INDEX: 18.87 KG/M2 | RESPIRATION RATE: 16 BRPM

## 2023-10-18 DIAGNOSIS — R10.32 BILATERAL GROIN PAIN: ICD-10-CM

## 2023-10-18 DIAGNOSIS — M53.3 SACROILIAC JOINT PAIN: ICD-10-CM

## 2023-10-18 DIAGNOSIS — M54.50 CHRONIC BILATERAL LOW BACK PAIN WITHOUT SCIATICA: ICD-10-CM

## 2023-10-18 DIAGNOSIS — R10.2 PELVIC PAIN: ICD-10-CM

## 2023-10-18 DIAGNOSIS — Z23 NEEDS FLU SHOT: ICD-10-CM

## 2023-10-18 DIAGNOSIS — G89.29 CHRONIC HIP PAIN, BILATERAL: ICD-10-CM

## 2023-10-18 DIAGNOSIS — M25.551 CHRONIC HIP PAIN, BILATERAL: ICD-10-CM

## 2023-10-18 DIAGNOSIS — G89.29 CHRONIC BILATERAL LOW BACK PAIN WITHOUT SCIATICA: ICD-10-CM

## 2023-10-18 DIAGNOSIS — R10.31 BILATERAL GROIN PAIN: ICD-10-CM

## 2023-10-18 DIAGNOSIS — M25.552 CHRONIC HIP PAIN, BILATERAL: ICD-10-CM

## 2023-10-18 DIAGNOSIS — S33.4XXS: Primary | ICD-10-CM

## 2023-10-18 DIAGNOSIS — N94.10 DYSPAREUNIA IN FEMALE: ICD-10-CM

## 2023-10-18 PROCEDURE — 90674 CCIIV4 VAC NO PRSV 0.5 ML IM: CPT | Performed by: NURSE PRACTITIONER

## 2023-10-18 PROCEDURE — G8420 CALC BMI NORM PARAMETERS: HCPCS | Performed by: NURSE PRACTITIONER

## 2023-10-18 PROCEDURE — G8482 FLU IMMUNIZE ORDER/ADMIN: HCPCS | Performed by: NURSE PRACTITIONER

## 2023-10-18 PROCEDURE — G8427 DOCREV CUR MEDS BY ELIG CLIN: HCPCS | Performed by: NURSE PRACTITIONER

## 2023-10-18 PROCEDURE — 1036F TOBACCO NON-USER: CPT | Performed by: NURSE PRACTITIONER

## 2023-10-18 PROCEDURE — 99214 OFFICE O/P EST MOD 30 MIN: CPT | Performed by: NURSE PRACTITIONER

## 2023-10-18 PROCEDURE — 90471 IMMUNIZATION ADMIN: CPT | Performed by: NURSE PRACTITIONER

## 2023-10-18 RX ORDER — TIZANIDINE 2 MG/1
2 TABLET ORAL 3 TIMES DAILY PRN
Qty: 30 TABLET | Refills: 0 | Status: SHIPPED | OUTPATIENT
Start: 2023-10-18

## 2023-10-18 RX ORDER — PROPRANOLOL HYDROCHLORIDE 20 MG/1
20 TABLET ORAL DAILY
COMMUNITY
Start: 2023-05-04

## 2023-10-18 RX ORDER — OXYCODONE HYDROCHLORIDE AND ACETAMINOPHEN 5; 325 MG/1; MG/1
.5-1 TABLET ORAL EVERY 6 HOURS PRN
Qty: 20 TABLET | Refills: 0 | Status: SHIPPED | OUTPATIENT
Start: 2023-10-18 | End: 2023-10-23

## 2023-10-18 RX ORDER — IBUPROFEN 600 MG/1
600 TABLET ORAL 3 TIMES DAILY PRN
Qty: 90 TABLET | Refills: 2 | Status: SHIPPED | OUTPATIENT
Start: 2023-10-18

## 2023-10-19 LAB
APPEARANCE UR: CLEAR
BILIRUB UR QL: NEGATIVE
COLOR UR: NORMAL
GLUCOSE UR STRIP.AUTO-MCNC: NEGATIVE MG/DL
HGB UR QL STRIP: NEGATIVE
KETONES UR QL STRIP.AUTO: NEGATIVE MG/DL
LEUKOCYTE ESTERASE UR QL STRIP.AUTO: NEGATIVE
NITRITE UR QL STRIP.AUTO: NEGATIVE
PH UR STRIP: 6.5 (ref 5–8)
PROT UR STRIP-MCNC: NEGATIVE MG/DL
SP GR UR REFRACTOMETRY: 1.01 (ref 1–1.03)
UROBILINOGEN UR QL STRIP.AUTO: 0.2 EU/DL (ref 0.2–1)

## 2023-10-20 LAB
BACTERIA SPEC CULT: NORMAL
SERVICE CMNT-IMP: NORMAL

## 2023-10-31 ENCOUNTER — HOSPITAL ENCOUNTER (OUTPATIENT)
Facility: HOSPITAL | Age: 38
Discharge: HOME | End: 2023-11-03
Payer: COMMERCIAL

## 2023-10-31 DIAGNOSIS — N94.10 DYSPAREUNIA IN FEMALE: ICD-10-CM

## 2023-10-31 DIAGNOSIS — M53.3 SACROILIAC JOINT PAIN: ICD-10-CM

## 2023-10-31 DIAGNOSIS — R10.2 PELVIC PAIN: ICD-10-CM

## 2023-10-31 DIAGNOSIS — M25.552 CHRONIC HIP PAIN, BILATERAL: ICD-10-CM

## 2023-10-31 DIAGNOSIS — G89.29 CHRONIC HIP PAIN, BILATERAL: ICD-10-CM

## 2023-10-31 DIAGNOSIS — M25.551 CHRONIC HIP PAIN, BILATERAL: ICD-10-CM

## 2023-10-31 DIAGNOSIS — R10.31 BILATERAL GROIN PAIN: ICD-10-CM

## 2023-10-31 DIAGNOSIS — R10.32 BILATERAL GROIN PAIN: ICD-10-CM

## 2023-10-31 PROCEDURE — 72195 MRI PELVIS W/O DYE: CPT

## 2024-01-05 ENCOUNTER — OFFICE VISIT (OUTPATIENT)
Age: 39
End: 2024-01-05
Payer: COMMERCIAL

## 2024-01-05 VITALS
TEMPERATURE: 97.4 F | HEIGHT: 66 IN | SYSTOLIC BLOOD PRESSURE: 115 MMHG | DIASTOLIC BLOOD PRESSURE: 65 MMHG | HEART RATE: 79 BPM | BODY MASS INDEX: 19.03 KG/M2 | OXYGEN SATURATION: 99 % | RESPIRATION RATE: 16 BRPM | WEIGHT: 118.4 LBS

## 2024-01-05 DIAGNOSIS — R20.2 PARESTHESIA OF FINGER: ICD-10-CM

## 2024-01-05 DIAGNOSIS — F06.4 ANXIETY DISORDER DUE TO KNOWN PHYSIOLOGICAL CONDITION: ICD-10-CM

## 2024-01-05 DIAGNOSIS — R25.1 TREMOR, UNSPECIFIED: ICD-10-CM

## 2024-01-05 DIAGNOSIS — G43.009 MIGRAINE WITHOUT AURA AND WITHOUT STATUS MIGRAINOSUS, NOT INTRACTABLE: Primary | ICD-10-CM

## 2024-01-05 PROCEDURE — G8427 DOCREV CUR MEDS BY ELIG CLIN: HCPCS

## 2024-01-05 PROCEDURE — 99215 OFFICE O/P EST HI 40 MIN: CPT

## 2024-01-05 PROCEDURE — 1036F TOBACCO NON-USER: CPT

## 2024-01-05 PROCEDURE — G8420 CALC BMI NORM PARAMETERS: HCPCS

## 2024-01-05 PROCEDURE — G8482 FLU IMMUNIZE ORDER/ADMIN: HCPCS

## 2024-01-05 ASSESSMENT — ENCOUNTER SYMPTOMS
RESPIRATORY NEGATIVE: 1
ALLERGIC/IMMUNOLOGIC NEGATIVE: 1
GASTROINTESTINAL NEGATIVE: 1
EYES NEGATIVE: 1

## 2024-01-05 ASSESSMENT — PATIENT HEALTH QUESTIONNAIRE - PHQ9
1. LITTLE INTEREST OR PLEASURE IN DOING THINGS: 0
SUM OF ALL RESPONSES TO PHQ QUESTIONS 1-9: 0
SUM OF ALL RESPONSES TO PHQ QUESTIONS 1-9: 0
SUM OF ALL RESPONSES TO PHQ9 QUESTIONS 1 & 2: 0
2. FEELING DOWN, DEPRESSED OR HOPELESS: 0
SUM OF ALL RESPONSES TO PHQ QUESTIONS 1-9: 0
SUM OF ALL RESPONSES TO PHQ QUESTIONS 1-9: 0

## 2024-01-05 NOTE — PATIENT INSTRUCTIONS
As per our discussion,    Since her migraine headaches have been stable at this time.  I will not make any changes on your medication.  Continue to take Nurtec as needed.    As we discussed,  The importance of a proper diet including plenty of fruits and vegetables as this can help with headache prevention.  The importance of staying hydrated and drinking at least 32 to 64 ounces of water daily as this can be a cause of tension type headaches.  The importance of keeping a headache journal or log to identify triggers.  The importance of sleep hygiene and attempting to get at least 6 to 8 hours of sleep daily to help with headache prevention.    As for the tremors, since you are not bothersome to you at this time, we will continue to monitor you for this.  Will not renew any prescription today.    As for the tingling on the fingertips, it could be cervical related versus possible carpal tunnel syndrome.  If you change your mind, please let us know.  I will recommend to obtain an EMG of the extremity if your symptoms persist and based on what EMG shows, will make additional recommendation.    It was  a pleasure to see you today    Will see you back in 6 to 8 months or sooner if needed.    Please do not hesitate to reach out for any questions or concerns.

## 2024-01-05 NOTE — PROGRESS NOTES
[facility]  In Office FOLLOW-UP VISIT         Jailyn Glass is a 38 y.o. female who presents today for the following:  Chief Complaint   Patient presents with    Migraine     Tension headaches, tingling in fingers         ASSESSMENT AND PLAN  Patient is known to this practice.  Chart and history reviewed in detail at today's office visit.    1. Migraine without aura and without status migrainosus, not intractable  Assessment & Plan:   Stable on current regimen.    She is on any preventative but her symptoms are well-managed on Nurtec 75 mg as needed.    Although the tingling on the left index finger could be associated with complex migraine given she had headaches for 4 to 5 days, will not make any additional recommendation and will continue to monitor patient for this.    We discussed the importance of a proper diet including plenty of fruits and vegetables as this can help with headache prevention.    We discussed the importance of staying hydrated and drinking at least 32 to 64 ounces of water daily as this can be a cause of tension type headaches.    We also discussed the importance of keeping a headache journal or log to identify triggers.    We discussed the importance of sleep hygiene and attempting to get at least 6 to 8 hours of sleep daily to help with headache prevention  2. Paresthesia of finger  Assessment & Plan:   Patient was experiencing paresthesias in her left index finger that started after experiencing tension headaches for 4 to 5 days.    Neurologic exam revealed tingling and discomfort with Phalen's  test but denied any pain in any other extremity.    Differential diagnosis included cervical radiculopathy, complex migraines, carpal tunnel syndrome.    Cervical MRI that was completed on 6/8/2022 showed minimal diffuse disc bulge at C6-C7.  There is no pathological intrathecal enhancement.  No stenosis.  Normal spinal cord.    Repeat of cervical MRI was

## 2024-01-05 NOTE — ASSESSMENT & PLAN NOTE
Stable on current regimen.    She is on any preventative but her symptoms are well-managed on Nurtec 75 mg as needed.    Although the tingling on the left index finger could be associated with complex migraine given she had headaches for 4 to 5 days, will not make any additional recommendation and will continue to monitor patient for this.    We discussed the importance of a proper diet including plenty of fruits and vegetables as this can help with headache prevention.    We discussed the importance of staying hydrated and drinking at least 32 to 64 ounces of water daily as this can be a cause of tension type headaches.    We also discussed the importance of keeping a headache journal or log to identify triggers.    We discussed the importance of sleep hygiene and attempting to get at least 6 to 8 hours of sleep daily to help with headache prevention

## 2024-01-05 NOTE — ASSESSMENT & PLAN NOTE
Patient was experiencing paresthesias in her left index finger that started after experiencing tension headaches for 4 to 5 days.    Neurologic exam revealed tingling and discomfort with Phalen's  test but denied any pain in any other extremity.    Differential diagnosis included cervical radiculopathy, complex migraines, carpal tunnel syndrome.    Cervical MRI that was completed on 6/8/2022 showed minimal diffuse disc bulge at C6-C7.  There is no pathological intrathecal enhancement.  No stenosis.  Normal spinal cord.    Repeat of cervical MRI was discussed with patient including an EMG to rule out any neuropathic issues but patient deferred at this time.    She is to reach out to our office if her symptoms get worse.

## 2024-01-05 NOTE — ASSESSMENT & PLAN NOTE
Patient verbalized she continued to experience tremors in bilateral hands and now she started noticing them on the head.    Symptoms are stable and they are not bothersome to her at this time.    Propranolol was prescribed at the last visit but patient does not want to take any medication and deferred to homeopathic options.    Will continue to monitor patient for this.  Medication options were reviewed with patient including propranolol and primidone.    Factors that may worsen tremors were reviewed with patient which include caffeine, lack of sleep, stress/anxiety, caffeine were reviewed with patient.  She verbalized understanding.

## 2024-01-05 NOTE — ASSESSMENT & PLAN NOTE
Stable    Well-managed on Xanax 0.5 mg as needed.    She is to continue to follow-up with her PCP for medication management.

## 2024-04-12 ENCOUNTER — OFFICE VISIT (OUTPATIENT)
Facility: CLINIC | Age: 39
End: 2024-04-12
Payer: COMMERCIAL

## 2024-04-12 VITALS
HEART RATE: 83 BPM | HEIGHT: 66 IN | OXYGEN SATURATION: 100 % | WEIGHT: 115.4 LBS | DIASTOLIC BLOOD PRESSURE: 70 MMHG | BODY MASS INDEX: 18.54 KG/M2 | RESPIRATION RATE: 16 BRPM | TEMPERATURE: 98.4 F | SYSTOLIC BLOOD PRESSURE: 122 MMHG

## 2024-04-12 DIAGNOSIS — J20.9 BRONCHITIS, ACUTE, WITH BRONCHOSPASM: Primary | ICD-10-CM

## 2024-04-12 DIAGNOSIS — R06.02 SOB (SHORTNESS OF BREATH): ICD-10-CM

## 2024-04-12 PROCEDURE — G8420 CALC BMI NORM PARAMETERS: HCPCS | Performed by: NURSE PRACTITIONER

## 2024-04-12 PROCEDURE — 99213 OFFICE O/P EST LOW 20 MIN: CPT | Performed by: NURSE PRACTITIONER

## 2024-04-12 PROCEDURE — G8427 DOCREV CUR MEDS BY ELIG CLIN: HCPCS | Performed by: NURSE PRACTITIONER

## 2024-04-12 PROCEDURE — 1036F TOBACCO NON-USER: CPT | Performed by: NURSE PRACTITIONER

## 2024-04-12 RX ORDER — AZITHROMYCIN 250 MG/1
250 TABLET, FILM COATED ORAL SEE ADMIN INSTRUCTIONS
Qty: 6 TABLET | Refills: 0 | Status: SHIPPED | OUTPATIENT
Start: 2024-04-12 | End: 2024-04-17

## 2024-04-12 RX ORDER — PREDNISONE 5 MG/1
TABLET ORAL
Qty: 1 EACH | Refills: 0 | Status: SHIPPED | OUTPATIENT
Start: 2024-04-12

## 2024-04-12 RX ORDER — ALBUTEROL SULFATE 90 UG/1
2 AEROSOL, METERED RESPIRATORY (INHALATION) EVERY 6 HOURS PRN
Qty: 18 G | Refills: 3 | Status: SHIPPED | OUTPATIENT
Start: 2024-04-12

## 2024-04-12 NOTE — PROGRESS NOTES
Chief Complaint   Patient presents with    Congestion     Congestion with chest tightness started Monday       SUBJECTIVE:    Jailyn Glass is a 38 y.o. female who is here today with complaints of chest tightness, shortness of breath, and frequent coughing with occasional production which started approximately 4 days ago.  She states her symptoms have gradually worsened since that time.  She has been using over-the-counter Zyrtec but without any improvement in her symptoms.  She denies any significant wheezing or fever.    Current Outpatient Medications   Medication Sig Dispense Refill    ibuprofen (ADVIL;MOTRIN) 600 MG tablet Take 1 tablet by mouth 3 times daily as needed for Pain 90 tablet 2    tiZANidine (ZANAFLEX) 2 MG tablet Take 1 tablet by mouth 3 times daily as needed (muscle spasms) 30 tablet 0    ALPRAZolam (XANAX) 0.5 MG tablet Take 0.5-1 tablets by mouth 2 times daily as needed.      promethazine (PHENERGAN) 25 MG tablet Take 1 tablet by mouth every 8 hours as needed      Rimegepant Sulfate 75 MG TBDP Take 75 mg by mouth every other day       No current facility-administered medications for this visit.     Past Medical History:   Diagnosis Date    ADINA (generalized anxiety disorder) 12/15/2022    Migraine without aura and without status migrainosus, not intractable 2024    Nausea & vomiting     Raynaud's disease     Rhesus isoimmunization affecting pregnancy     A negative     Past Surgical History:   Procedure Laterality Date    BREAST SURGERY Bilateral 2010     SECTION  ,     HEENT      wisdom teeth extraction    HERNIA REPAIR  2021     No Known Allergies    REVIEW OF SYSTEMS:                                        POSITIVE= bold text  Negative = regular text    General:                     fever, chills, sweats, generalized weakness, weight loss/gain,                                       loss of appetite   Eyes:                           blurred vision, eye pain, loss of

## 2024-04-12 NOTE — PROGRESS NOTES
Jailyn Glass is a 38 y.o. female     Chief Complaint   Patient presents with    Congestion     Congestion with chest tightness started Monday       /70 (Site: Left Upper Arm, Position: Sitting, Cuff Size: Medium Adult)   Pulse 83   Temp 98.4 °F (36.9 °C) (Oral)   Resp 16   Ht 1.676 m (5' 6\")   Wt 52.3 kg (115 lb 6.4 oz)   SpO2 100%   BMI 18.63 kg/m²     Health Maintenance Due   Topic Date Due    Hepatitis B vaccine (1 of 3 - 3-dose series) Never done    Varicella vaccine (1 of 2 - 2-dose childhood series) Never done    COVID-19 Vaccine (4 - 2023-24 season) 09/01/2023         \"Have you been to the ER, urgent care clinic since your last visit?  Hospitalized since your last visit?\"    NO    “Have you seen or consulted any other health care providers outside of [facility] since your last visit?”    NO

## 2024-05-08 ENCOUNTER — PATIENT MESSAGE (OUTPATIENT)
Facility: CLINIC | Age: 39
End: 2024-05-08

## 2024-05-08 DIAGNOSIS — F41.1 GENERALIZED ANXIETY DISORDER: Primary | ICD-10-CM

## 2024-05-09 RX ORDER — ALPRAZOLAM 0.5 MG/1
.25-.5 TABLET ORAL 2 TIMES DAILY PRN
Qty: 60 TABLET | Refills: 0 | Status: SHIPPED | OUTPATIENT
Start: 2024-05-09 | End: 2024-06-08

## 2024-05-09 NOTE — TELEPHONE ENCOUNTER
PCP: Rocky Harrison APRN - NP    Last appt: 4/12/2024    Future Appointments   Date Time Provider Department Center   7/17/2024  8:30 AM Molly Lacy APRN - NP [facility]       Requested Prescriptions     Pending Prescriptions Disp Refills    ALPRAZolam (XANAX) 0.5 MG tablet 60 tablet 0     Sig: Take 0.5-1 tablets by mouth 2 times daily as needed for Anxiety for up to 30 days. Max Daily Amount: 1 mg

## 2024-10-25 ENCOUNTER — OFFICE VISIT (OUTPATIENT)
Facility: CLINIC | Age: 39
End: 2024-10-25
Payer: COMMERCIAL

## 2024-10-25 VITALS
HEIGHT: 66 IN | TEMPERATURE: 98.2 F | RESPIRATION RATE: 16 BRPM | OXYGEN SATURATION: 99 % | WEIGHT: 116 LBS | HEART RATE: 77 BPM | SYSTOLIC BLOOD PRESSURE: 116 MMHG | BODY MASS INDEX: 18.64 KG/M2 | DIASTOLIC BLOOD PRESSURE: 68 MMHG

## 2024-10-25 DIAGNOSIS — L98.9 SKIN ABNORMALITY: Primary | ICD-10-CM

## 2024-10-25 DIAGNOSIS — G43.009 MIGRAINE WITHOUT AURA AND WITHOUT STATUS MIGRAINOSUS, NOT INTRACTABLE: ICD-10-CM

## 2024-10-25 PROCEDURE — 1036F TOBACCO NON-USER: CPT | Performed by: NURSE PRACTITIONER

## 2024-10-25 PROCEDURE — G8484 FLU IMMUNIZE NO ADMIN: HCPCS | Performed by: NURSE PRACTITIONER

## 2024-10-25 PROCEDURE — 99214 OFFICE O/P EST MOD 30 MIN: CPT | Performed by: NURSE PRACTITIONER

## 2024-10-25 PROCEDURE — G8427 DOCREV CUR MEDS BY ELIG CLIN: HCPCS | Performed by: NURSE PRACTITIONER

## 2024-10-25 PROCEDURE — G8420 CALC BMI NORM PARAMETERS: HCPCS | Performed by: NURSE PRACTITIONER

## 2024-10-25 RX ORDER — RIMEGEPANT SULFATE 75 MG/75MG
1 TABLET, ORALLY DISINTEGRATING ORAL DAILY PRN
Qty: 9 TABLET | Refills: 5 | Status: SHIPPED | OUTPATIENT
Start: 2024-10-25

## 2024-10-25 SDOH — ECONOMIC STABILITY: FOOD INSECURITY: WITHIN THE PAST 12 MONTHS, THE FOOD YOU BOUGHT JUST DIDN'T LAST AND YOU DIDN'T HAVE MONEY TO GET MORE.: NEVER TRUE

## 2024-10-25 SDOH — ECONOMIC STABILITY: FOOD INSECURITY: WITHIN THE PAST 12 MONTHS, YOU WORRIED THAT YOUR FOOD WOULD RUN OUT BEFORE YOU GOT MONEY TO BUY MORE.: NEVER TRUE

## 2024-10-25 SDOH — ECONOMIC STABILITY: INCOME INSECURITY: HOW HARD IS IT FOR YOU TO PAY FOR THE VERY BASICS LIKE FOOD, HOUSING, MEDICAL CARE, AND HEATING?: NOT HARD AT ALL

## 2024-10-25 NOTE — PROGRESS NOTES
Chief Complaint   Patient presents with    Skin Problem       SUBJECTIVE:    Jailyn Glass is a 39 y.o. female who is here today to discuss issues with skin on her forehead and would like to talk about management of migraine headaches.    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  These were injected with Kenalog locally and seem to improve.  She had a follow-up appointment about a month later.  She states that in early October, she began to see skin changes to the right upper aspect of her forehead, noting a gradual and progressive indentation near her scalp line.  She states that over the course of the following weeks, the indentation has become more pronounced and bigger in general.  She denies any pain, rash, bruising, tingling, or numbness to the area.  She has had no changes in topical agents or cosmetics.  She has no known history of trauma to this area.    Additionally, the patient has a longstanding history of migraine headaches.  She states she has been on Nurtec since 2022 which has been helpful in aborting her migraines when they occur.  She states she typically has migraines that triggered for a variety of reasons, typically anywhere from 6-8 times a month, unilateral in nature, often with audiophobia/photophobia, and require some form of intervention to help them aidee.  She states she has a history of using sumatriptan several years ago, but this caused her migraines to actually get worse.  She states she also had been trialed on Ubrelvy, but found the medication to be ineffective overall.  She states that she has only had adequate relief with use of Nurtec.  She is requesting a refill of the medication today.    Current Outpatient Medications   Medication Sig Dispense Refill    albuterol sulfate HFA (PROVENTIL HFA) 108 (90 Base) MCG/ACT inhaler Inhale 2 puffs into the lungs every 6 hours as needed for Wheezing 18 g 3    ibuprofen

## 2024-10-25 NOTE — PROGRESS NOTES
Jailyn Glass is a 39 y.o. female     Chief Complaint   Patient presents with    Skin Problem       /68 (Site: Left Upper Arm, Position: Sitting, Cuff Size: Small Adult)   Pulse 77   Temp 98.2 °F (36.8 °C) (Oral)   Resp 16   Ht 1.676 m (5' 6\")   Wt 52.6 kg (116 lb)   SpO2 99%   BMI 18.72 kg/m²     Health Maintenance Due   Topic Date Due    Varicella vaccine (1 of 2 - 13+ 2-dose series) Never done    Hepatitis B vaccine (1 of 3 - 19+ 3-dose series) Never done    Flu vaccine (1) 08/01/2024    COVID-19 Vaccine (4 - 2023-24 season) 09/01/2024    Breast cancer screen  Never done         \"Have you been to the ER, urgent care clinic since your last visit?  Hospitalized since your last visit?\"    NO    “Have you seen or consulted any other health care providers outside of [facility] since your last visit?”    NO       Have you had a mammogram?”   YES - Where: [facility] Nurse/CMA to request most recent records if not in the chart    No breast cancer screening on file

## 2024-11-29 ENCOUNTER — PATIENT MESSAGE (OUTPATIENT)
Facility: CLINIC | Age: 39
End: 2024-11-29

## 2024-11-29 DIAGNOSIS — F41.9 ANXIETY: ICD-10-CM

## 2024-11-29 DIAGNOSIS — F19.982 DRUG-INDUCED INSOMNIA (HCC): Primary | ICD-10-CM

## 2024-11-29 RX ORDER — ALPRAZOLAM 0.5 MG
.25-.5 TABLET ORAL 3 TIMES DAILY PRN
Qty: 30 TABLET | Refills: 2 | Status: SHIPPED | OUTPATIENT
Start: 2024-11-29 | End: 2024-12-29

## 2024-12-07 ENCOUNTER — HOSPITAL ENCOUNTER (OUTPATIENT)
Facility: HOSPITAL | Age: 39
Discharge: HOME | End: 2024-12-10
Payer: COMMERCIAL

## 2024-12-07 DIAGNOSIS — L94.1 LINEAR SCLERODERMA: ICD-10-CM

## 2024-12-07 PROCEDURE — 6360000004 HC RX CONTRAST MEDICATION: Performed by: DERMATOLOGY

## 2024-12-07 PROCEDURE — 70553 MRI BRAIN STEM W/O & W/DYE: CPT

## 2024-12-07 PROCEDURE — A9579 GAD-BASE MR CONTRAST NOS,1ML: HCPCS | Performed by: DERMATOLOGY

## 2024-12-07 RX ADMIN — GADOTERIDOL 10 ML: 279.3 INJECTION, SOLUTION INTRAVENOUS at 09:34

## 2025-04-11 ENCOUNTER — HOSPITAL ENCOUNTER (EMERGENCY)
Facility: HOSPITAL | Age: 40
Discharge: HOME | End: 2025-04-11
Attending: STUDENT IN AN ORGANIZED HEALTH CARE EDUCATION/TRAINING PROGRAM
Payer: COMMERCIAL

## 2025-04-11 VITALS
SYSTOLIC BLOOD PRESSURE: 95 MMHG | OXYGEN SATURATION: 100 % | TEMPERATURE: 97.3 F | HEART RATE: 89 BPM | RESPIRATION RATE: 16 BRPM | WEIGHT: 116.4 LBS | DIASTOLIC BLOOD PRESSURE: 61 MMHG | BODY MASS INDEX: 18.79 KG/M2

## 2025-04-11 DIAGNOSIS — R51.9 ACUTE NONINTRACTABLE HEADACHE, UNSPECIFIED HEADACHE TYPE: Primary | ICD-10-CM

## 2025-04-11 DIAGNOSIS — J06.9 VIRAL URI: ICD-10-CM

## 2025-04-11 LAB
ALBUMIN SERPL-MCNC: 3.9 G/DL (ref 3.5–5)
ALBUMIN/GLOB SERPL: 1.3 (ref 1.1–2.2)
ALP SERPL-CCNC: 44 U/L (ref 45–117)
ALT SERPL-CCNC: 22 U/L (ref 12–78)
ANION GAP SERPL CALC-SCNC: 3 MMOL/L (ref 2–12)
AST SERPL-CCNC: 16 U/L (ref 15–37)
BASOPHILS # BLD: 0.04 K/UL (ref 0–0.1)
BASOPHILS NFR BLD: 0.4 % (ref 0–1)
BILIRUB SERPL-MCNC: 1.2 MG/DL (ref 0.2–1)
BUN SERPL-MCNC: 10 MG/DL (ref 6–20)
BUN/CREAT SERPL: 14 (ref 12–20)
CALCIUM SERPL-MCNC: 9.1 MG/DL (ref 8.5–10.1)
CHLORIDE SERPL-SCNC: 109 MMOL/L (ref 97–108)
CO2 SERPL-SCNC: 24 MMOL/L (ref 21–32)
CREAT SERPL-MCNC: 0.69 MG/DL (ref 0.55–1.02)
DIFFERENTIAL METHOD BLD: ABNORMAL
EOSINOPHIL # BLD: 0.03 K/UL (ref 0–0.4)
EOSINOPHIL NFR BLD: 0.3 % (ref 0–7)
ERYTHROCYTE [DISTWIDTH] IN BLOOD BY AUTOMATED COUNT: 12.1 % (ref 11.5–14.5)
FLUAV RNA SPEC QL NAA+PROBE: NOT DETECTED
FLUBV RNA SPEC QL NAA+PROBE: NOT DETECTED
GLOBULIN SER CALC-MCNC: 3 G/DL (ref 2–4)
GLUCOSE SERPL-MCNC: 130 MG/DL (ref 65–100)
HCG SERPL QL: NEGATIVE
HCT VFR BLD AUTO: 38.9 % (ref 35–47)
HGB BLD-MCNC: 13.3 G/DL (ref 11.5–16)
IMM GRANULOCYTES # BLD AUTO: 0.04 K/UL (ref 0–0.04)
IMM GRANULOCYTES NFR BLD AUTO: 0.4 % (ref 0–0.5)
LYMPHOCYTES # BLD: 1.44 K/UL (ref 0.8–3.5)
LYMPHOCYTES NFR BLD: 13.6 % (ref 12–49)
MCH RBC QN AUTO: 30 PG (ref 26–34)
MCHC RBC AUTO-ENTMCNC: 34.2 G/DL (ref 30–36.5)
MCV RBC AUTO: 87.8 FL (ref 80–99)
MONOCYTES # BLD: 0.45 K/UL (ref 0–1)
MONOCYTES NFR BLD: 4.2 % (ref 5–13)
NEUTS SEG # BLD: 8.61 K/UL (ref 1.8–8)
NEUTS SEG NFR BLD: 81.1 % (ref 32–75)
NRBC # BLD: 0 K/UL (ref 0–0.01)
NRBC BLD-RTO: 0 PER 100 WBC
PLATELET # BLD AUTO: 198 K/UL (ref 150–400)
PMV BLD AUTO: 9.6 FL (ref 8.9–12.9)
POTASSIUM SERPL-SCNC: 3.9 MMOL/L (ref 3.5–5.1)
PROT SERPL-MCNC: 6.9 G/DL (ref 6.4–8.2)
RBC # BLD AUTO: 4.43 M/UL (ref 3.8–5.2)
SARS-COV-2 RNA RESP QL NAA+PROBE: NOT DETECTED
SODIUM SERPL-SCNC: 136 MMOL/L (ref 136–145)
SOURCE: NORMAL
WBC # BLD AUTO: 10.6 K/UL (ref 3.6–11)

## 2025-04-11 PROCEDURE — 99284 EMERGENCY DEPT VISIT MOD MDM: CPT

## 2025-04-11 PROCEDURE — 80053 COMPREHEN METABOLIC PANEL: CPT

## 2025-04-11 PROCEDURE — 84703 CHORIONIC GONADOTROPIN ASSAY: CPT

## 2025-04-11 PROCEDURE — 96361 HYDRATE IV INFUSION ADD-ON: CPT

## 2025-04-11 PROCEDURE — 96375 TX/PRO/DX INJ NEW DRUG ADDON: CPT

## 2025-04-11 PROCEDURE — 85025 COMPLETE CBC W/AUTO DIFF WBC: CPT

## 2025-04-11 PROCEDURE — 2580000003 HC RX 258: Performed by: STUDENT IN AN ORGANIZED HEALTH CARE EDUCATION/TRAINING PROGRAM

## 2025-04-11 PROCEDURE — 96374 THER/PROPH/DIAG INJ IV PUSH: CPT

## 2025-04-11 PROCEDURE — 87636 SARSCOV2 & INF A&B AMP PRB: CPT

## 2025-04-11 PROCEDURE — 6360000002 HC RX W HCPCS: Performed by: STUDENT IN AN ORGANIZED HEALTH CARE EDUCATION/TRAINING PROGRAM

## 2025-04-11 PROCEDURE — 36415 COLL VENOUS BLD VENIPUNCTURE: CPT

## 2025-04-11 RX ORDER — KETOROLAC TROMETHAMINE 30 MG/ML
15 INJECTION, SOLUTION INTRAMUSCULAR; INTRAVENOUS ONCE
Status: DISCONTINUED | OUTPATIENT
Start: 2025-04-11 | End: 2025-04-11 | Stop reason: HOSPADM

## 2025-04-11 RX ORDER — ACETAMINOPHEN 500 MG
1000 TABLET ORAL
Status: DISCONTINUED | OUTPATIENT
Start: 2025-04-11 | End: 2025-04-11 | Stop reason: HOSPADM

## 2025-04-11 RX ORDER — PROCHLORPERAZINE EDISYLATE 5 MG/ML
5 INJECTION INTRAMUSCULAR; INTRAVENOUS ONCE
Status: COMPLETED | OUTPATIENT
Start: 2025-04-11 | End: 2025-04-11

## 2025-04-11 RX ORDER — SODIUM CHLORIDE, SODIUM LACTATE, POTASSIUM CHLORIDE, AND CALCIUM CHLORIDE .6; .31; .03; .02 G/100ML; G/100ML; G/100ML; G/100ML
1000 INJECTION, SOLUTION INTRAVENOUS ONCE
Status: COMPLETED | OUTPATIENT
Start: 2025-04-11 | End: 2025-04-11

## 2025-04-11 RX ORDER — DIPHENHYDRAMINE HYDROCHLORIDE 50 MG/ML
25 INJECTION, SOLUTION INTRAMUSCULAR; INTRAVENOUS
Status: COMPLETED | OUTPATIENT
Start: 2025-04-11 | End: 2025-04-11

## 2025-04-11 RX ADMIN — SODIUM CHLORIDE, SODIUM LACTATE, POTASSIUM CHLORIDE, AND CALCIUM CHLORIDE 1000 ML: .6; .31; .03; .02 INJECTION, SOLUTION INTRAVENOUS at 18:46

## 2025-04-11 RX ADMIN — DIPHENHYDRAMINE HYDROCHLORIDE 25 MG: 50 INJECTION INTRAMUSCULAR; INTRAVENOUS at 18:47

## 2025-04-11 RX ADMIN — PROCHLORPERAZINE EDISYLATE 5 MG: 5 INJECTION INTRAMUSCULAR; INTRAVENOUS at 18:48

## 2025-04-11 ASSESSMENT — LIFESTYLE VARIABLES
HOW MANY STANDARD DRINKS CONTAINING ALCOHOL DO YOU HAVE ON A TYPICAL DAY: PATIENT DOES NOT DRINK
HOW OFTEN DO YOU HAVE A DRINK CONTAINING ALCOHOL: NEVER

## 2025-04-11 NOTE — ED PROVIDER NOTES
[facility] EMERGENCY DEPARTMENT  EMERGENCY DEPARTMENT ENCOUNTER       Pt Name: Jailyn Glass  MRN: [id number]  Birthdate 1985  Date of evaluation: 2025  Provider: Jhonatan Bai MD   PCP: Rocky Harrison APRN - NP  Note Started: 6:54 PM EDT 25     CHIEF COMPLAINT       Chief Complaint   Patient presents with    Headache     Patient reports a headache, hot and cold chills, dizziness and nausea. Patient reports taking her BP at home and reports it was 123/67. Patient reports taking methotrexate at home 12.5 mg PTA due to an auto immune disorder.         HISTORY OF PRESENT ILLNESS: 1 or more elements      History From: patient, History limited by: none     Jailyn Glass is a 39 y.o. female presenting with headache, nausea chills.  This started on Wednesday with general URI type symptoms but progressed today to include the headache as well as some dizziness.  She notes her daughters are sick with general URI type symptoms.  She did take Zofran 1 hour prior to arrival as well as Nurtec for which she takes regularly for headaches as needed.  Denies any diarrhea.  No dysuria.  Last menstrual period approximately 22 days ago.  No chest pain cough or difficulty breathing.       Please See MDM for Additional Details of the HPI/PMH  Nursing Notes were all reviewed and agreed with or any disagreements were addressed in the HPI.     REVIEW OF SYSTEMS        Positives and Pertinent negatives as per HPI.    PAST HISTORY     Past Medical History:  Past Medical History:   Diagnosis Date    ADINA (generalized anxiety disorder) 12/15/2022    Migraine without aura and without status migrainosus, not intractable 2024    Nausea & vomiting     Raynaud's disease     Rhesus isoimmunization affecting pregnancy     A negative       Past Surgical History:  Past Surgical History:   Procedure Laterality Date    BREAST SURGERY Bilateral 2010     SECTION  ,     HEENT      wisdom teeth extraction    HERNIA

## 2025-04-11 NOTE — ED NOTES
Bedside report given to ROMAN Maldonado by ROMAN Mora. Nurse was informed of reason for arrival, vitals, labs, medications, orders, procedures, results, cardiac rhythm, any outstanding and pending orders and plan of care. Opportunity for questions were provided for receiving RN at this time.

## 2025-04-18 ENCOUNTER — PATIENT MESSAGE (OUTPATIENT)
Facility: CLINIC | Age: 40
End: 2025-04-18

## 2025-04-18 DIAGNOSIS — G43.009 MIGRAINE WITHOUT AURA AND WITHOUT STATUS MIGRAINOSUS, NOT INTRACTABLE: Primary | ICD-10-CM

## 2025-04-18 RX ORDER — OXYCODONE AND ACETAMINOPHEN 5; 325 MG/1; MG/1
.5-1 TABLET ORAL EVERY 6 HOURS PRN
Qty: 20 TABLET | Refills: 0 | Status: SHIPPED | OUTPATIENT
Start: 2025-04-18 | End: 2025-04-23

## 2025-04-21 ENCOUNTER — OFFICE VISIT (OUTPATIENT)
Facility: CLINIC | Age: 40
End: 2025-04-21
Payer: COMMERCIAL

## 2025-04-21 VITALS
SYSTOLIC BLOOD PRESSURE: 114 MMHG | RESPIRATION RATE: 16 BRPM | TEMPERATURE: 98.3 F | BODY MASS INDEX: 18.8 KG/M2 | WEIGHT: 117 LBS | HEIGHT: 66 IN | HEART RATE: 76 BPM | DIASTOLIC BLOOD PRESSURE: 72 MMHG | OXYGEN SATURATION: 99 %

## 2025-04-21 DIAGNOSIS — Z00.00 ROUTINE PHYSICAL EXAMINATION: Primary | ICD-10-CM

## 2025-04-21 DIAGNOSIS — F41.0 EPISODIC PAROXYSMAL ANXIETY DISORDER: ICD-10-CM

## 2025-04-21 DIAGNOSIS — L94.0 MORPHEA SCLERODERMA: ICD-10-CM

## 2025-04-21 DIAGNOSIS — G43.709 CHRONIC MIGRAINE WITHOUT AURA WITHOUT STATUS MIGRAINOSUS, NOT INTRACTABLE: ICD-10-CM

## 2025-04-21 PROCEDURE — 99396 PREV VISIT EST AGE 40-64: CPT | Performed by: NURSE PRACTITIONER

## 2025-04-21 RX ORDER — ALPRAZOLAM 0.5 MG
.25-.5 TABLET ORAL 3 TIMES DAILY PRN
Qty: 30 TABLET | Refills: 2 | Status: SHIPPED | OUTPATIENT
Start: 2025-04-21 | End: 2025-05-21

## 2025-04-21 RX ORDER — ALPRAZOLAM 2 MG/1
1 TABLET ORAL 2 TIMES DAILY
COMMUNITY
End: 2025-04-21

## 2025-04-21 RX ORDER — ATOGEPANT 30 MG/1
30 TABLET ORAL DAILY
Qty: 30 TABLET | Refills: 2 | Status: SHIPPED | OUTPATIENT
Start: 2025-04-21

## 2025-04-21 RX ORDER — METHOTREXATE 2.5 MG/1
12.5 TABLET ORAL WEEKLY
COMMUNITY
Start: 2025-01-21

## 2025-04-21 RX ORDER — LEUCOVORIN CALCIUM 5 MG/1
5 TABLET ORAL WEEKLY
COMMUNITY
Start: 2024-11-08

## 2025-04-21 SDOH — ECONOMIC STABILITY: FOOD INSECURITY: WITHIN THE PAST 12 MONTHS, THE FOOD YOU BOUGHT JUST DIDN'T LAST AND YOU DIDN'T HAVE MONEY TO GET MORE.: NEVER TRUE

## 2025-04-21 SDOH — ECONOMIC STABILITY: FOOD INSECURITY: WITHIN THE PAST 12 MONTHS, YOU WORRIED THAT YOUR FOOD WOULD RUN OUT BEFORE YOU GOT MONEY TO BUY MORE.: NEVER TRUE

## 2025-04-21 ASSESSMENT — PATIENT HEALTH QUESTIONNAIRE - PHQ9
SUM OF ALL RESPONSES TO PHQ QUESTIONS 1-9: 0
2. FEELING DOWN, DEPRESSED OR HOPELESS: NOT AT ALL
1. LITTLE INTEREST OR PLEASURE IN DOING THINGS: NOT AT ALL
SUM OF ALL RESPONSES TO PHQ QUESTIONS 1-9: 0

## 2025-04-21 NOTE — PROGRESS NOTES
Jailyn Glass is a 39 y.o. female     Chief Complaint   Patient presents with    Annual Exam       /72   Pulse 76   Temp 98.3 °F (36.8 °C) (Oral)   Resp 16   Ht 1.676 m (5' 6\")   Wt 53.1 kg (117 lb)   SpO2 99%   BMI 18.88 kg/m²     Health Maintenance Due   Topic Date Due    Varicella vaccine (1 of 2 - 13+ 2-dose series) Never done    Hepatitis B vaccine (1 of 3 - 19+ 3-dose series) Never done    COVID-19 Vaccine (4 - 2024-25 season) 09/01/2024    Breast cancer screen  Never done    Depression Screen  01/05/2025         \"Have you been to the ER, urgent care clinic since your last visit?  Hospitalized since your last visit?\"    YES - When: approximately 11 days ago.  Where and Why: [facility].    “Have you seen or consulted any other health care providers outside of [facility] since your last visit?”    NO       Have you had a mammogram?”   YES - Where: [facility] Nurse/[facility] to request most recent records if not in the chart    No breast cancer screening on file                   
Protein      3. Episodic paroxysmal anxiety disorder  ALPRAZolam (XANAX) 0.5 MG tablet      4. Morphea scleroderma          1: Patient to return for fasting labs including: TSH, lipid panel, and CRP.  2: Due to lack of efficacy of Nurtec, I will switch patient to Qulipta 30 mg to take daily.  Prescription sent to pharmacy.  3: Continue alprazolam as needed for breakthrough anxiety and panic symptoms.  4: Continue all other medications as prescribed.  5: Follow-up with dermatology for continued management of morphea scleroderma.  6: Patient to follow-up with me for recheck in approximately 6 weeks for reevaluation, or sooner if necessary.  Patient agrees and states understanding.      ATTENTION:   This medical record was transcribed using an electronic medical records system.  Although proofread, it may and can contain electronic and spelling errors.  Other human spelling and other errors may be present.  Corrections may be executed at a later time.  Please feel free to contact us for any clarifications as needed.    Signed,  Rocky Harrison, MSN APRN FNP-BC

## 2025-04-22 ENCOUNTER — TELEPHONE (OUTPATIENT)
Facility: CLINIC | Age: 40
End: 2025-04-22

## 2025-04-22 NOTE — TELEPHONE ENCOUNTER
Approved  PA Detail   Prior authorization approved  Payer: [employer] - Commercial Case ID: [id number]    [phone]    [phone]  Note from payer: Request Reference Number: [id number]. QULIPTA TAB 30MG is approved through 10/22/2025. Your patient may now fill this prescription and it will be covered. - Additional information is available to assist in the completion of the prior authorization. This information is accessible via the PA Detail / Prior Auth Portal link.  Approval Details    Authorized from April 22, 2025 to October 22, 2025

## 2025-04-28 ENCOUNTER — LAB (OUTPATIENT)
Facility: CLINIC | Age: 40
End: 2025-04-28

## 2025-04-28 DIAGNOSIS — G43.709 CHRONIC MIGRAINE WITHOUT AURA WITHOUT STATUS MIGRAINOSUS, NOT INTRACTABLE: ICD-10-CM

## 2025-04-28 DIAGNOSIS — Z00.00 ROUTINE PHYSICAL EXAMINATION: ICD-10-CM

## 2025-04-28 LAB
CHOLEST SERPL-MCNC: 200 MG/DL
CRP SERPL-MCNC: <0.29 MG/DL (ref 0–0.3)
HDLC SERPL-MCNC: 68 MG/DL
HDLC SERPL: 2.9 (ref 0–5)
LDLC SERPL CALC-MCNC: 116.2 MG/DL (ref 0–100)
TRIGL SERPL-MCNC: 79 MG/DL
TSH SERPL DL<=0.05 MIU/L-ACNC: 2.78 UIU/ML (ref 0.36–3.74)
VLDLC SERPL CALC-MCNC: 15.8 MG/DL

## 2025-04-29 ENCOUNTER — RESULTS FOLLOW-UP (OUTPATIENT)
Facility: CLINIC | Age: 40
End: 2025-04-29

## 2025-06-02 ENCOUNTER — OFFICE VISIT (OUTPATIENT)
Facility: CLINIC | Age: 40
End: 2025-06-02
Payer: COMMERCIAL

## 2025-06-02 VITALS
BODY MASS INDEX: 18.58 KG/M2 | SYSTOLIC BLOOD PRESSURE: 116 MMHG | TEMPERATURE: 98.3 F | HEART RATE: 63 BPM | DIASTOLIC BLOOD PRESSURE: 72 MMHG | WEIGHT: 115.6 LBS | RESPIRATION RATE: 16 BRPM | HEIGHT: 66 IN | OXYGEN SATURATION: 99 %

## 2025-06-02 DIAGNOSIS — R06.02 EXERTIONAL SHORTNESS OF BREATH: ICD-10-CM

## 2025-06-02 DIAGNOSIS — G43.009 MIGRAINE WITHOUT AURA AND WITHOUT STATUS MIGRAINOSUS, NOT INTRACTABLE: Primary | ICD-10-CM

## 2025-06-02 PROCEDURE — G8428 CUR MEDS NOT DOCUMENT: HCPCS | Performed by: NURSE PRACTITIONER

## 2025-06-02 PROCEDURE — 1036F TOBACCO NON-USER: CPT | Performed by: NURSE PRACTITIONER

## 2025-06-02 PROCEDURE — 99213 OFFICE O/P EST LOW 20 MIN: CPT | Performed by: NURSE PRACTITIONER

## 2025-06-02 PROCEDURE — G8420 CALC BMI NORM PARAMETERS: HCPCS | Performed by: NURSE PRACTITIONER

## 2025-06-02 NOTE — PROGRESS NOTES
Jailyn Glass is a 39 y.o. female     Chief Complaint   Patient presents with    Medication Check     6 wk       /72   Pulse 63   Temp 98.3 °F (36.8 °C) (Oral)   Resp 16   Ht 1.676 m (5' 6\")   Wt 52.4 kg (115 lb 9.6 oz)   SpO2 99%   BMI 18.66 kg/m²     Health Maintenance Due   Topic Date Due    Varicella vaccine (1 of 2 - 13+ 2-dose series) Never done    Hepatitis B vaccine (1 of 3 - 19+ 3-dose series) Never done    COVID-19 Vaccine (4 - 2024-25 season) 09/01/2024         \"Have you been to the ER, urgent care clinic since your last visit?  Hospitalized since your last visit?\"    NO    “Have you seen or consulted any other health care providers outside of [facility] since your last visit?”    NO

## 2025-06-02 NOTE — PROGRESS NOTES
Chief Complaint   Patient presents with    Medication Check     6 wk       SUBJECTIVE:    Jailyn Glass is a 39 y.o. female who is here today for a follow up appointment regarding her migraine headaches.    At our previous encounter on 04/21/2025, the patient was given a prescription for Qulipta 30 mg to be taken daily.  At that time, she had already tried Ubrelvy with marginal benefit, and the Nurtec that she had been using seem to no longer be as effective as it once was.  She was approved for the Qulipta and started that shortly thereafter.  She is currently taking 30 mg daily.  She states initially that she had some side effect of appetite suppression which is gradually diminished over time.  She states she started having occasional episodes of chest \"tightness\" with a sensation of pressure that has occurred intermittently, and often with physical exertion.  She states the episodes last for few minutes then gradually dissipate over time.  She denies any associated cough, hemoptysis, or significant shortness of breath.  She denies any chest pain or dizziness during these episodes.  She feels that the migraine headaches have improved quite a bit since starting the Qulipta.  She feels that her migraine symptoms are much better managed and has used NSAIDs for abortive which has been effective overall.  She has had 1 episode of an ocular migraine which lasted approximately 20 minutes.      Current Outpatient Medications   Medication Sig Dispense Refill    methotrexate (RHEUMATREX) 2.5 MG chemo tablet Take 5 tablets by mouth once a week      leucovorin calcium (WELLCOVORIN) 5 MG tablet Take 1 tablet by mouth once a week      Atogepant (QULIPTA) 30 MG TABS Take 30 mg by mouth daily 30 tablet 2     No current facility-administered medications for this visit.     Past Medical History:   Diagnosis Date    ADINA (generalized anxiety disorder) 12/15/2022    Migraine without aura and without status migrainosus, not

## 2025-07-11 DIAGNOSIS — G43.709 CHRONIC MIGRAINE WITHOUT AURA WITHOUT STATUS MIGRAINOSUS, NOT INTRACTABLE: ICD-10-CM

## 2025-07-11 RX ORDER — ATOGEPANT 30 MG/1
30 TABLET ORAL DAILY
Qty: 30 TABLET | Refills: 2 | Status: SHIPPED | OUTPATIENT
Start: 2025-07-11

## 2025-07-11 NOTE — TELEPHONE ENCOUNTER
PCP: Rocky Harrison, APRN - NP    Last appt: 6/2/2025    No future appointments.    Requested Prescriptions     Pending Prescriptions Disp Refills    Atogepant (QULIPTA) 30 MG TABS 30 tablet 2     Sig: Take 30 mg by mouth daily

## (undated) DEVICE — SOLIDIFIER MEDC 1200ML -- CONVERT TO 356117

## (undated) DEVICE — 3M™ IOBAN™ 2 ANTIMICROBIAL INCISE DRAPE 6651EZ: Brand: IOBAN™ 2

## (undated) DEVICE — SUTURE MCRYL SZ 4-0 L27IN ABSRB UD L19MM PS-2 1/2 CIR PRIM Y426H

## (undated) DEVICE — SOLUTION IV 1000ML 0.9% SOD CHL

## (undated) DEVICE — ADHESIVE TISS DERMA FLEX 0.7ML -- HIGH VISCOSITY

## (undated) DEVICE — INFECTION CONTROL KIT SYS

## (undated) DEVICE — REM POLYHESIVE ADULT PATIENT RETURN ELECTRODE: Brand: VALLEYLAB

## (undated) DEVICE — SURGICAL PROCEDURE KIT GEN LAPAROSCOPY LF

## (undated) DEVICE — STERILE POLYISOPRENE POWDER-FREE SURGICAL GLOVES: Brand: PROTEXIS

## (undated) DEVICE — VISUALIZATION SYSTEM: Brand: CLEARIFY

## (undated) DEVICE — TIP CLEANER: Brand: VALLEYLAB

## (undated) DEVICE — Device

## (undated) DEVICE — PENCIL ES L3M BTTN SWCH S STL HEX LOK BLDE ELECTRD HOLSTER

## (undated) DEVICE — ARM DRAPE

## (undated) DEVICE — (D)PREP SKN CHLRAPRP APPL 26ML -- CONVERT TO ITEM 371833

## (undated) DEVICE — SUTURE MCRYL SZ 3-0 L27IN ABSRB UD L19MM PS-2 3/8 CIR PRIM Y427H

## (undated) DEVICE — DRAPE,REIN 53X77,STERILE: Brand: MEDLINE

## (undated) DEVICE — SYR 10ML LUER LOK 1/5ML GRAD --

## (undated) DEVICE — MEDI-VAC NON-CONDUCTIVE SUCTION TUBING: Brand: CARDINAL HEALTH

## (undated) DEVICE — SEAL UNIV 5-8MM DISP BX/10 -- DA VINCI XI - SNGL USE

## (undated) DEVICE — SUTURE VCRL SZ 0 L36IN ABSRB VLT L40MM CT 1/2 CIR J358H

## (undated) DEVICE — BLADELESS OBTURATOR: Brand: WECK VISTA

## (undated) DEVICE — COVER,TABLE,60X90,STERILE: Brand: MEDLINE

## (undated) DEVICE — DEVON™ KNEE AND BODY STRAP 60" X 3" (1.5 M X 7.6 CM): Brand: DEVON

## (undated) DEVICE — COVER MPLR TIP CRV SCIS ACC DA VINCI

## (undated) DEVICE — GOWN,SIRUS,NONRNF,SETINSLV,XL,20/CS: Brand: MEDLINE

## (undated) DEVICE — KIT,1200CC CANISTER,3/16"X6' TUBING: Brand: MEDLINE INDUSTRIES, INC.

## (undated) DEVICE — STRAP,POSITIONING,KNEE/BODY,FOAM,4X60": Brand: MEDLINE

## (undated) DEVICE — BINDER ABD M/L H12IN FOR 46-62IN WHT 4 SLD PNL DSGN HOOP

## (undated) DEVICE — C-SECTION II-LF: Brand: MEDLINE INDUSTRIES, INC.

## (undated) DEVICE — SUT STRATA PDS+ 15CM SZ 3-0 SH -- STRATAFIX

## (undated) DEVICE — SUTURE VCRL SZ 3-0 L36IN ABSRB VLT CT L40MM 1/2 CIR J356H

## (undated) DEVICE — CATHETER IV 14GA L2IN POLYUR STR ORNG HUB SFTY RADPQ DISP

## (undated) DEVICE — SUTURE PROL 2-0 L48IN NONABSORBABLE BLU SH L26MM 1/2 CIR 8533H

## (undated) DEVICE — SUTURE STRATAFIX SYMMETRIC SZ 1 L18IN ABSRB VLT CT1 L36CM SXPP1A404

## (undated) DEVICE — DERMABOND SKIN ADH 0.7ML -- DERMABOND ADVANCED 12/BX

## (undated) DEVICE — 3000CC GUARDIAN II: Brand: GUARDIAN

## (undated) DEVICE — GOWN,AURORA,FABRIC-REINFORCED,X-LARGE: Brand: MEDLINE

## (undated) DEVICE — SUTURE MCRYL SZ 0 L36IN ABSRB VLT L48MM CTX 1/2 CIR Y398H

## (undated) DEVICE — GARMENT,MEDLINE,DVT,INT,CALF,MED, GEN2: Brand: MEDLINE

## (undated) DEVICE — NEEDLE HYPO 22GA L1.5IN BLK S STL HUB POLYPR SHLD REG BVL

## (undated) DEVICE — SUTURE PDS II SZ 2-0 L27IN ABSRB VLT SH L26MM 1/2 CIR Z317H

## (undated) DEVICE — KENDALL SCD EXPRESS SLEEVES, KNEE LENGTH, MEDIUM: Brand: KENDALL SCD

## (undated) DEVICE — PREP SKN CHLRAPRP APL 26ML STR --

## (undated) DEVICE — COVER,MAYO STAND,STERILE: Brand: MEDLINE

## (undated) DEVICE — GLOVE SURG SZ 7.5 L11.2IN THK9.8MIL STRW LTX POLYMER BEAD